# Patient Record
Sex: FEMALE | Race: WHITE | HISPANIC OR LATINO | Employment: OTHER | URBAN - METROPOLITAN AREA
[De-identification: names, ages, dates, MRNs, and addresses within clinical notes are randomized per-mention and may not be internally consistent; named-entity substitution may affect disease eponyms.]

---

## 2021-09-09 ENCOUNTER — INITIAL PRENATAL (OUTPATIENT)
Dept: OBGYN CLINIC | Facility: CLINIC | Age: 21
End: 2021-09-09

## 2021-09-09 VITALS — DIASTOLIC BLOOD PRESSURE: 62 MMHG | SYSTOLIC BLOOD PRESSURE: 100 MMHG | WEIGHT: 118 LBS

## 2021-09-09 DIAGNOSIS — Z34.91 FIRST TRIMESTER PREGNANCY: Primary | ICD-10-CM

## 2021-09-09 DIAGNOSIS — Z11.3 SCREENING EXAMINATION FOR VENEREAL DISEASE: ICD-10-CM

## 2021-09-09 PROCEDURE — PNV: Performed by: PHYSICIAN ASSISTANT

## 2021-09-09 RX ORDER — ESCITALOPRAM OXALATE 10 MG/1
10 TABLET ORAL DAILY
COMMUNITY
End: 2021-09-09
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
## 2021-09-09 NOTE — PATIENT INSTRUCTIONS
Vaccine Decision Making  Im pregnant  Should I get a COVID vaccine? The information here is about the [facility] and Moderna COVID-19 vaccines  These are also called mRNA vaccines  Reference numbers written like this (#) correspond to the footnotes at the end of this section  For most people, getting the COVID vaccine as soon as possible is the safest choice  However, these vaccines have not been tested in pregnant and breastfeeding women yet  The information below will help you make an informed choice about whether to get an mRNA COVID vaccine while you are pregnant or trying to get pregnant  Your options:   Get a COVID vaccine as soon as it is available    Wait for more information about the vaccines in pregnancy    What are the benefits of getting an mRNA COVID Vaccine? 1  COVID is dangerous  It is more dangerous for pregnant women   COVID patients who are pregnant are 5 times more likely to end up in the intensive care unit (ICU) or on a ventilator than COVID patients who are not pregnant  (#1)    birth may be more common for pregnant women with severe COVID  (#2)   Pregnant women are more likely to die of COVID than non-pregnant women with COVID who are the same age  (#3,4)    2  The mRNA COVID vaccines prevent about 95% of COVID infections   As COVID infections go up in our communities, your risk of getting COVID goes up too   Getting a vaccine will prevent you from getting COVID and may help keep you from giving COVID to people around you, like your family  3  The mRNA COVID vaccines cannot give you COVID   These vaccines have no live virus  (#5)   These vaccines do NOT contain ingredients that are known to be harmful to pregnant women or to the fetus   Many vaccines are routinely given in pregnancy and are safe (for example: tetanus, diphtheria, and flu)    More details about how these vaccines work can be found below in the section "More information about mRNA COVID vaccines"  What are the risks of getting an mRNA COVID vaccine? 1  These COVID vaccines have not yet been tested in pregnant people   These vaccines were tested in over 40,000 people, and there were no serious side effects related to the vaccine   We do not know if the vaccines work as well in pregnant people as they did in non-pregnant people   We do not know whether there are unique downsides in pregnancy, like different side effects or an increased risk of miscarriage or fetal abnormalities   The Moderna vaccine was tested in female rats to look at its effects on pregnancy  No significant negative effects were found on female fertility or fetal development   Some women became pregnant during the vaccine studies  Eighteen of these women were in the vaccine group, and two months later none had miscarried  There were [de-identified] women in the placebo group who became pregnant, and two months later two of them had had miscarriages  (In general, 10-20% of pregnancies end in miscarriage)   Because these studies are still ongoing, we dont know how the rest of the pregnancy went for these women  2  People getting the vaccine will probably have some side effects   Many people had symptoms caused by their immune systems normal response to the vaccine  The most common side effects were:(#6)   injection site reactions like sore arm (~84%)   fatigue (~62%)   headache (~55%)   muscle pain (~38%)   chills (~32%)   joint pain (~24%)   fever (~14%)   Of 100 people who get the vaccine, 1 will get a high fever (over 102°F)  A persistent high fever during the first trimester might increase the risk of fetal abnormalities or miscarriage  The CDC recommends using Tylenol (acetaminophen) during pregnancy if you have a high fever  Another option is to delay your COVID vaccine until after the first trimester  What do the experts recommend?   Because COVID is dangerous and easily spread, the CDC says that the mRNA vaccines for COVID-19 are recommended for adults  (#7)  However, because there are no studies of pregnant women yet, there are no clear recommendations for pregnant women  This is standard for a new drug and is not due to any particular concern with this vaccine  The Society for Maternal-Fetal Medicine strongly recommends that pregnant individuals have access to COVID vaccines  They recommend that each person talk to their doctor or midwife about their own personal choice  (#8)    The [address] and Gynecologists recommends that the COVID vaccine should not be withheld from pregnant individuals  (#9)    What else should I think about to help me decide? Make sure you understand as much as you can about COVID and about the vaccine  Ask a trusted source, like your midwife or doctor  Continue reading below for more information about the vaccine  Think about your own personal risk  Look at the columns below and think about your risk of getting COVID (left)  Think about your safety - are you able to stay safe (right)? The risks of getting sick from [facility]  are higher if If you are not at higher risk for COVID  and   ?? You have contact with people  outside your home ? ? You are always able to wear a mask   ? ? You are 28years old or older ? ? You and the people you live with  can socially distance from others for  your whole pregnancy   ? ? You are overweight ? ? Your community does NOT have  high or increasing COVID cases   ? ? You have other medical problems  like diabetes, high blood pressure,  or heart disease ? ? You think the vaccine itself will make  you very nervous (you are more  worried about the unknown risks  than about getting COVID)   ? ? You are a smoker ? ? You have had a severe allergic  reaction to a vaccine   ? ? You are a racial or ethnic minority, or your community has a high rate of COVID infections    ? ?  You are a healthcare worker(#10)    If you are at a higher risk of getting  COVID, it probably makes sense to get  the vaccine   it might make sense for you to wait  for more information  What about breastfeeding? The Society for [address] [place] [address] Street of Breastfeeding Medicine report that there is no reason to believe that the vaccine affects the safety of breastmilk  8,11 The vaccine does not contain the virus, so there is no risk of infecting your baby  Because mRNA is fragile, it is very unlikely that any part of the vaccine gets into breastmilk  When we have an infection or get a vaccine, our bodies make antibodies to fight the infection  Antibodies can pass into the breastmilk and then to the baby - and may help prevent infections  Summary  1  COVID seems to cause more harm in pregnant women than in women of the same age who are not pregnant  2  The risks of getting an mRNA COVID vaccine during pregnancy are thought to be small but are not totally known  3  You should consider your own personal risk of getting COVID  If your personal risk is high, or there are many cases of COVID in your community, it probably makes sense for you to get a vaccine while pregnant  4  Whether to get a COVID vaccine during pregnancy is your choice  What do pregnant doctors think? "We know COVID can be terrible in pregnancy, and we know the vaccine doesn't contain live virus  Im approaching my third trimester and I work on the front lines of this disease, so for me the choice is clear, I intend to be first in line as soon as they will let me have one " (Pregnant Emergency Department Doctor)    "I am a little nervous about getting something that hasnt been tested in pregnant patients  Early pregnancy is a nerve-wracking time, even without the unknown of a new vaccine  So, I went over the risks and benefits of getting or not getting it as a front- - with myself, my partner, and my doctors   We ended up deciding I should get the vaccine " (Pregnant viruses like a cold virus work - viruses use our body and cells to make their proteins  Then our immune system attacks those proteins to keep us healthy   There is no way for the vaccine to give people COVID  (#5)    What did the research show? The Pfizer and Moderna mRNA vaccine trials each had over 30,000 people (including those who got placebo) and showed that the vaccine lowers a persons chance of getting COVID and severe COVID  In each study, over 15,000 people got the vaccine and over 15,000 people got a saline injection (placebo)   After one dose, the vaccine appears to be 50% effective  After 2 doses, both vaccines are about 95% effective   In other words, for every 100 people who got COVID in the placebo group, only 5 people got COVID in the mRNA vaccine groups   Severe cases of COVID were also reduced in both mRNA vaccine groups   There were no serious safety concerns  Intended Use   This decision aid is intended for use by pregnant women (and women planning on becoming pregnant) who are considering getting the COVID-19 vaccine, as well as their  healthcare providers, and their friends and family  It was created by the Shared Decision-Making: COVID Vaccination in Pregnancy working group at the [facility]  This group consists of experts in the fields of OB/GYN, Maternal-Fetal Medicine, Shared Decision-Making and risk communication, Emergency Medicine, and current COVID-19 research  Questions should be directed to Dr [email or web address]  Feedback regarding the utility of this decision aid can be directed through the survey (see link above)  This decision aid can be reproduced and distributed without additional permission  [place] and [place] versions available at http://foamcast org/COVIDvacPregnancy  Updated December 22, 2020  1   Jareth MENDEZ, et al  Pregnant women with severe or critical COVID-19 have increased composite morbidity compared to  non-pregnant matched controls  Am J Obstet 2020 doi: 10 1016/j ajog  11 022  2  Ambika MEJIA, et al  Pregnancy outcomes among women with and without severe acute respiratory syndrome coronavirus  2 infection  [facility]  Nov 3(11):[id number]  3  Gabriela MATTHEW WAP[facility] working group on COVID-19  Maternal and  Outcomes of Pregnancy Women with SARScoV-  2 infection  Ultrasound Obstet Gynecol  2020  doi: 10 1002/uog  73671  4  Centers for Disease Control and Prevention  Update: Characteristics of Symptomatic Women of Reproductive Age with  Laboratory-Confirmed SARS-CoV-2 Infection by Pregnancy Status -- [place], -October 3, 2020  2020:1-7   5  Michael MANDUJANO  COVID-19 and mRNA Vaccines--First Large Test for a New Approach  ALAN  2020;324(12):0549-8499  doi:10 1001/alan   58601  6  [employer] pt  7  [place] com br ([address], )  8  SM statement on COVID vaccination in pregnancy: https://www  fm org/publications/339-society-for-maternal-fetalmedicine-  smfm-statement-sars-cov-2-vaccination-in-pregnancy  9  [employer] com au-  Lactating-Patients-Against-COVID-19 (Accessed 2020)  10  Eliezer Tijerina et al  Occupation and risk of severe COVID-19: prospective cohort study of  [facility] participants  Occupational and Environmental Medicine Published Online First: 2020  doi: 10 1136/cengp-1092-859151  11  https://abm memberclicks net/nfq-rbeaffgau-sjpqccxwhxrmyj-for-covid-19-vaccination-in-lactation    Coronavirus (COVID-19) pregnancy FAQs: Medical experts answer your questions  From [employer] com cy  com/0_coronavirus-covid-19-pregnancy-faq-medical-dpogtyo-cdqmul-rf_48483234  bc (courtesy of [facility])  As of 3/18/20  By Tami Bansal 39  Medically reviewed by Society for Maternal-Fetal Medicine       We asked parents-to-be to send us their most pressing questions about coronavirus (COVID-19)  Among them: Is it still safe to deliver in a hospital? What if my ob-gyn has the virus? We sent those questions to the top docs at the Society for Maternal-Fetal Medicine  Here are their answers  The coronavirus (COVID-19) pandemic has been declared a national emergency in the [facility] by [employer]  Moms-to-be like you are concerned about everything from getting medicines to managing disruptions at work  But above and beyond any worry about lifestyle changes is a focus on your health and the impact of COVID-19 on your pregnancy  We asked obstetrics doctors who handle the most complicated pregnancy issues to answer your questions about the coronavirus  Here are their responses, provided by Dr Mani Sethi and her colleagues at the Society for [place] [address]  Am I at more risk for COVID-19 if I'm pregnant? We don't know  Pregnancy does change your immune system in ways that might make you more susceptible to viral respiratory infections like COVID-19  If you become infected, you might also be at higher risk for more severe illness compared to the general population  Although this does not appear to be the case with COVID-19, based on limited data so far, a higher risk has been true for pregnant women with other coronavirus infections (SARS-CoV and MERS-CoV) and other viral respiratory infections, such as flu  It's important to take preventive actions to avoid infection, such as washing your hands often and avoiding people who are sick  How might coronavirus affect my pregnancy? Again, we don't know  Women with other coronavirus infections (such as SARS-CoV) did not have miscarriage or stillbirth at higher rates than the general population    We know that having other respiratory viral infections during pregnancy, such as flu, has been associated with problems like low birth weight and  birth  Also, having a high fever early in pregnancy may increase the risk of certain birth defects  Could I transmit coronavirus to my baby during pregnancy or delivery? We don't know whether you could transmit COVID-19 to your baby before or during delivery  However, among the few case studies of infants born to mothers with COVID-23 published in peer-reviewed literature, none of the infants tested positive for the virus  Also, there was no virus detected in samples of amniotic fluid or breast milk  There have been a few reports of newborns as young as a few days old with infection, suggesting that a mother can transmit the infection to her infant through close contact after delivery  There have been no reports of mother-to-baby transmission for other coronaviruses (MERS-CoV and SARS-CoV), although only limited information is available  Is it safe for me to deliver at a hospital where there have been COVID-19 cases? Yes  We know that COVID-19 is a very scary virus  The good news is that hospitals are taking great precautions to keep patients and healthcare providers safe  When a patient is even suspected to have COVID-19, they are placed in a negative pressure room  (Think of these rooms as vacuums that suck and filter the air so it's safe for the other people in the hospital)  This makes it possible for you to deliver at the hospital without putting you or your baby at risk  Hospitals are also implementing stricter visiting policies to keep patients safe  It's worth calling your hospital to check if there are any new regulations to be aware of  What plans should I make now in case the hospital system is overwhelmed when it's time for me to deliver? This is a great question to talk with your doctor or midwife about when you're 34 to 36 weeks pregnant   Every hospital is making different plans for dealing with this scenario  I work in healthcare  Should I ask my doctor to excuse me from work until the baby is born? What if I work in a school, the travel [place] [employer], or some other high-risk setting? Healthcare facilities should take care to limit the exposure of pregnant employees to patients with confirmed or suspected COVID-19, just as they would with other infectious cases  If you continue working, be sure to follow the CDC's risk assessment and infection control guidelines  If you work in a school, travel [place] [employer], or other high-risk setting, talk with your employer about what it's doing to protect employees and minimize infection risks  Wash your hands often  What if my OB gets COVID-19? If your doctor or midwife tests positive for COVID-19, they will need to be quarantined until they recover and are no longer at risk of transmitting the virus  In this case, you'll be assigned to another OB in your doctor's practice (or you may choose another practitioner yourself)  Ask your new OB or your doctor's office if you should self-quarantine or be tested for the virus  (It will depend on when you last saw your provider and when that person tested positive )    Should we hold off on trying to conceive because of COVID-19? At this time, there's no reason to hold off on trying to get pregnant, but the data we have is really limited  For example, we don't think the virus causes birth defects or increases your risk of miscarriage  But we don't know whether you could transmit COVID-19 to your baby before or during delivery  We also don't know if the virus lives in semen or can be sexually transmitted  We have a babymoon scheduled in the next few months - should we cancel? If you're planning to travel internationally or on a cruise, you should strongly consider canceling   At this time, the virus has reached more than 140 countries, and there are travel bans to [place], most of [place], and [place]  Places where large numbers of people gather are at highest risk, especially airports and cruise ships  If you're planning travel in the [place] , note that any travel setting increases your risk of exposure, and there may be travel bans even in [place] by the time you're scheduled to go  Even if you're state is not blocked, you'll definitely want to avoid traveling to communities where the virus is spreading  To find out where these places are, check The [place] Times map based on CDC data  For the most current advice to help you avoid exposure, check the CDC's COVID-19 travel page  Will the hospital separate me from my  and keep the baby in quarantine? If you test positive for COVID-19 or have been exposed but have no symptoms, the CDC, [employer] of Obstetricians and Gynecologists, and the Society for [place] 250 all recommend that you be  from your baby to decrease the risk of transmission to the baby  This would last until you are no longer at risk of transmitting the virus  If you do not have COVID-19 and have not been exposed to the virus, the hospital will not separate you from your   My hospital is restricting visitors and only allowing one support person  If my support person leaves after the delivery, will they be allowed to come back? Every hospital has different policies  Contact your hospital or labor and delivery unit a week or so before delivery to get the most up-to-date restrictions  In general, if your support person needs to leave, they would be allowed back unless they knew they were exposed to COVID-19 after leaving your company  [facility] understands that the coronavirus (COVID-19) pandemic is an evolving story and that your questions will change over time   We'll continue asking moms and dads in our Community what they want to know, and we'll get the answers from experts to keep them - and you - informed and supported  My mom was planning to fly here to help me care for my new baby after delivery  Should I tell her not to come? Yes  If your mom is over 61 or has any serious chronic medical conditions (such as heart disease, lung disease, or diabetes), she is at higher risk of serious illness from COVID-19 and should avoid air travel  And remember that any travel setting increases a person's risk of exposure  So, it may be risky to have her around the baby after she has been traveling  For the most current advice on traveling, check the [facility]'s COVID-19 travel page  [facility] understands that the coronavirus pandemic is an evolving story and that your questions will change over time  We'll continue asking moms and dads in our Community what they want to know, and we'll get the answers from experts to keep them - and you - informed and supported

## 2021-09-13 LAB
C TRACH RRNA SPEC QL NAA+PROBE: NEGATIVE
N GONORRHOEA RRNA SPEC QL NAA+PROBE: NEGATIVE
T VAGINALIS RRNA SPEC QL NAA+PROBE: NEGATIVE

## 2021-09-17 ENCOUNTER — PATIENT MESSAGE (OUTPATIENT)
Dept: OBGYN CLINIC | Facility: CLINIC | Age: 21
End: 2021-09-17

## 2021-09-20 NOTE — TELEPHONE ENCOUNTER
Spoke with patient informed that her blurred vision and headache states she has had all weekend denies any pelvic pain , vaginal bleeding or loss of fluids , she is 10 weeks gestation , advised patient to call her PCP and be evaluated , also advised in an emergent situation it is always best to call the office and try to get Intouch with the on call doc after hours rather than send a mychart message to the box that is not managed over the weekend    AMAURY ARTEAGA

## 2021-09-28 LAB
ABO GROUP BLD: NORMAL
APPEARANCE UR: CLEAR
BACTERIA UR CULT: NORMAL
BACTERIA URNS QL MICRO: ABNORMAL
BASOPHILS # BLD AUTO: 0 X10E3/UL (ref 0–0.2)
BASOPHILS NFR BLD AUTO: 0 %
BILIRUB UR QL STRIP: NEGATIVE
BLD GP AB SCN SERPL QL: NEGATIVE
CASTS URNS QL MICRO: ABNORMAL /LPF
CF COMMENT: NORMAL
CFTR MUT ANL BLD/T: NORMAL
CLINICAL INFO: NORMAL
COLOR UR: YELLOW
DEPRECATED FTI SERPL-MCNC: 2.3 UG/DL (ref 1.2–4.9)
EOSINOPHIL # BLD AUTO: 0 X10E3/UL (ref 0–0.4)
EOSINOPHIL NFR BLD AUTO: 0 %
EPI CELLS #/AREA URNS HPF: ABNORMAL /HPF (ref 0–10)
ERYTHROCYTE [DISTWIDTH] IN BLOOD BY AUTOMATED COUNT: 11.8 % (ref 11.7–15.4)
ETHNIC BACKGROUND STATED: NORMAL
EXTERNAL RUBELLA IGG QUANTITATION: NORMAL
GENE MUT TESTED BLD/T: NORMAL
GENERAL COMMENTS:: NORMAL
GLUCOSE UR QL: NEGATIVE
HBV SURFACE AG SERPL QL IA: NEGATIVE
HCT VFR BLD AUTO: 32.9 % (ref 34–46.6)
HCV AB S/CO SERPL IA: 0.1 S/CO RATIO (ref 0–0.9)
HGB A MFR BLD: 2.6 % (ref 1.8–3.2)
HGB A MFR BLD: 96.8 % (ref 96.4–98.8)
HGB BLD-MCNC: 11.3 G/DL (ref 11.1–15.9)
HGB F MFR BLD: 0.6 % (ref 0–2)
HGB FRACT BLD-IMP: NORMAL
HGB S MFR BLD: 0 %
HGB UR QL STRIP: NEGATIVE
HIV 1+2 AB+HIV1 P24 AG SERPL QL IA: NON REACTIVE
IMM GRANULOCYTES # BLD: 0 X10E3/UL (ref 0–0.1)
IMM GRANULOCYTES NFR BLD: 0 %
KETONES UR QL STRIP: NEGATIVE
LAB DIRECTOR NAME PROVIDER: NORMAL
LEUKOCYTE ESTERASE UR QL STRIP: ABNORMAL
LYMPHOCYTES # BLD AUTO: 1.6 X10E3/UL (ref 0.7–3.1)
LYMPHOCYTES NFR BLD AUTO: 22 %
Lab: NO GROWTH
MCH RBC QN AUTO: 33.8 PG (ref 26.6–33)
MCHC RBC AUTO-ENTMCNC: 34.3 G/DL (ref 31.5–35.7)
MCV RBC AUTO: 99 FL (ref 79–97)
MICRO URNS: ABNORMAL
MONOCYTES # BLD AUTO: 0.4 X10E3/UL (ref 0.1–0.9)
MONOCYTES NFR BLD AUTO: 6 %
MUCOUS THREADS URNS QL MICRO: PRESENT
NEUTROPHILS # BLD AUTO: 5.3 X10E3/UL (ref 1.4–7)
NEUTROPHILS NFR BLD AUTO: 72 %
NITRITE UR QL STRIP: NEGATIVE
PH UR STRIP: 6 [PH] (ref 5–7.5)
PLATELET # BLD AUTO: 222 X10E3/UL (ref 150–450)
PROT UR QL STRIP: NEGATIVE
RBC # BLD AUTO: 3.34 X10E6/UL (ref 3.77–5.28)
RBC #/AREA URNS HPF: ABNORMAL /HPF (ref 0–2)
REASON FOR REFERRAL (NARRATIVE): NORMAL
REF LAB TEST METHOD: NORMAL
RH BLD: POSITIVE
RPR SER QL: NON REACTIVE
RUBV IGG SERPL IA-ACNC: 1.59 INDEX
SL AMB DISCLAIMER: NORMAL
SL AMB GENETIC COUNSELOR: NORMAL
SMN1 GENE MUT ANL BLD/T: NORMAL
SP GR UR: 1.01 (ref 1–1.03)
SPECIMEN SOURCE: NORMAL
T3RU NFR SERPL: 22 % (ref 24–39)
T4 SERPL-MCNC: 10.6 UG/DL (ref 4.5–12)
UROBILINOGEN UR STRIP-ACNC: 0.2 MG/DL (ref 0.2–1)
VZV IGG SER IA-ACNC: 1166 INDEX
WBC # BLD AUTO: 7.4 X10E3/UL (ref 3.4–10.8)
WBC #/AREA URNS HPF: ABNORMAL /HPF (ref 0–5)

## 2021-10-01 ENCOUNTER — TELEPHONE (OUTPATIENT)
Dept: PERINATAL CARE | Facility: CLINIC | Age: 21
End: 2021-10-01

## 2021-10-07 ENCOUNTER — ROUTINE PRENATAL (OUTPATIENT)
Dept: PERINATAL CARE | Facility: OTHER | Age: 21
End: 2021-10-07
Payer: COMMERCIAL

## 2021-10-07 VITALS
HEIGHT: 64 IN | HEART RATE: 101 BPM | WEIGHT: 120.37 LBS | BODY MASS INDEX: 20.55 KG/M2 | DIASTOLIC BLOOD PRESSURE: 74 MMHG | SYSTOLIC BLOOD PRESSURE: 115 MMHG

## 2021-10-07 DIAGNOSIS — Z81.3 FAMILY HISTORY OF DRUG ADDICTION: ICD-10-CM

## 2021-10-07 DIAGNOSIS — Z3A.13 13 WEEKS GESTATION OF PREGNANCY: ICD-10-CM

## 2021-10-07 DIAGNOSIS — Z36.82 ENCOUNTER FOR ANTENATAL SCREENING FOR NUCHAL TRANSLUCENCY: Primary | ICD-10-CM

## 2021-10-07 PROCEDURE — 99212 OFFICE O/P EST SF 10 MIN: CPT | Performed by: OBSTETRICS & GYNECOLOGY

## 2021-10-07 PROCEDURE — 76813 OB US NUCHAL MEAS 1 GEST: CPT | Performed by: OBSTETRICS & GYNECOLOGY

## 2021-10-07 RX ORDER — NALOXONE HYDROCHLORIDE 4 MG/.1ML
1 SPRAY NASAL ONCE AS NEEDED
Qty: 1 EACH | Refills: 5 | Status: SHIPPED | OUTPATIENT
Start: 2021-10-07

## 2021-10-11 ENCOUNTER — ROUTINE PRENATAL (OUTPATIENT)
Dept: OBGYN CLINIC | Facility: CLINIC | Age: 21
End: 2021-10-11

## 2021-10-11 VITALS — BODY MASS INDEX: 20.6 KG/M2 | DIASTOLIC BLOOD PRESSURE: 60 MMHG | WEIGHT: 120 LBS | SYSTOLIC BLOOD PRESSURE: 102 MMHG

## 2021-10-11 DIAGNOSIS — Z34.02 ENCOUNTER FOR SUPERVISION OF NORMAL FIRST PREGNANCY IN SECOND TRIMESTER: Primary | ICD-10-CM

## 2021-10-11 PROCEDURE — PNV: Performed by: NURSE PRACTITIONER

## 2021-10-13 LAB
CFDNA.FET/CFDNA.TOTAL SFR FETUS: NORMAL %
CFDNA.FET/CFDNA.TOTAL SFR FETUS: NORMAL %
CITATION REF LAB TEST: NORMAL
FET 13+18+21+X+Y ANEUP PLAS.CFDNA: NEGATIVE
FET CHR 21 TS PLAS.CFDNA QL: NEGATIVE
FET CHR 21 TS PLAS.CFDNA QL: NEGATIVE
FET MS X RISK WBC.DNA+CFDNA QL: NOT DETECTED
FET SEX PLAS.CFDNA DOSAGE CFDNA: NORMAL
FET TS 13 RISK PLAS.CFDNA QL: NEGATIVE
FET X + Y ANEUP RISK PLAS.CFDNA SEQ-IMP: NOT DETECTED
GESTATION: NORMAL
GESTATIONAL AGE > 9:: YES
LAB DIRECTOR NAME PROVIDER: NORMAL
LABORATORY COMMENT REPORT: NORMAL
LIMITATIONS OF THE TEST: NORMAL
NEGATIVE PREDICTIVE VALUE: NORMAL
PERFORMANCE CHARACTERISTICS: NORMAL
POSITIVE PREDICTIVE VALUE: NORMAL
REF LAB TEST METHOD: NORMAL
SL AMB NOTE:: NORMAL
TEST PERFORMANCE INFO SPEC: NORMAL

## 2021-10-15 ENCOUNTER — TELEPHONE (OUTPATIENT)
Dept: PERINATAL CARE | Facility: OTHER | Age: 21
End: 2021-10-15

## 2021-11-04 ENCOUNTER — TELEPHONE (OUTPATIENT)
Dept: FAMILY MEDICINE CLINIC | Facility: CLINIC | Age: 21
End: 2021-11-04

## 2021-11-04 LAB
2ND TRIMESTER 4 SCREEN SERPL-IMP: NORMAL
AFP ADJ MOM SERPL: 0.79
AFP INTERP AMN-IMP: NORMAL
AFP INTERP SERPL-IMP: NORMAL
AFP INTERP SERPL-IMP: NORMAL
AFP SERPL-MCNC: 33.4 NG/ML
AGE AT DELIVERY: 21.2 YR
GA METHOD: NORMAL
GA: 16.6 WEEKS
IDDM PATIENT QL: NO
MULTIPLE PREGNANCY: NO
NEURAL TUBE DEFECT RISK FETUS: NORMAL %

## 2021-11-08 ENCOUNTER — ROUTINE PRENATAL (OUTPATIENT)
Dept: OBGYN CLINIC | Facility: CLINIC | Age: 21
End: 2021-11-08

## 2021-11-08 VITALS — WEIGHT: 126 LBS | DIASTOLIC BLOOD PRESSURE: 58 MMHG | SYSTOLIC BLOOD PRESSURE: 102 MMHG | BODY MASS INDEX: 21.63 KG/M2

## 2021-11-08 DIAGNOSIS — Z34.02 ENCOUNTER FOR SUPERVISION OF NORMAL FIRST PREGNANCY IN SECOND TRIMESTER: Primary | ICD-10-CM

## 2021-11-08 PROCEDURE — PNV: Performed by: NURSE PRACTITIONER

## 2021-11-30 ENCOUNTER — ROUTINE PRENATAL (OUTPATIENT)
Dept: PERINATAL CARE | Facility: OTHER | Age: 21
End: 2021-11-30
Payer: COMMERCIAL

## 2021-11-30 VITALS
HEIGHT: 64 IN | SYSTOLIC BLOOD PRESSURE: 117 MMHG | DIASTOLIC BLOOD PRESSURE: 79 MMHG | WEIGHT: 129.41 LBS | BODY MASS INDEX: 22.09 KG/M2 | HEART RATE: 89 BPM

## 2021-11-30 DIAGNOSIS — O28.3 ECHOGENIC INTRACARDIAC FOCUS OF FETUS ON PRENATAL ULTRASOUND: ICD-10-CM

## 2021-11-30 DIAGNOSIS — Z36.86 ENCOUNTER FOR ANTENATAL SCREENING FOR CERVICAL LENGTH: ICD-10-CM

## 2021-11-30 DIAGNOSIS — Z36.89 ENCOUNTER FOR FETAL ANATOMIC SURVEY: ICD-10-CM

## 2021-11-30 DIAGNOSIS — Z3A.20 20 WEEKS GESTATION OF PREGNANCY: Primary | ICD-10-CM

## 2021-11-30 PROBLEM — F32.A DEPRESSION AFFECTING PREGNANCY: Status: ACTIVE | Noted: 2021-11-30

## 2021-11-30 PROBLEM — O99.340 DEPRESSION AFFECTING PREGNANCY: Status: ACTIVE | Noted: 2021-11-30

## 2021-11-30 PROBLEM — O99.519 ASTHMA DURING PREGNANCY: Status: ACTIVE | Noted: 2021-11-30

## 2021-11-30 PROBLEM — J45.909 ASTHMA DURING PREGNANCY: Status: ACTIVE | Noted: 2021-11-30

## 2021-11-30 PROCEDURE — 99213 OFFICE O/P EST LOW 20 MIN: CPT | Performed by: OBSTETRICS & GYNECOLOGY

## 2021-11-30 PROCEDURE — 76811 OB US DETAILED SNGL FETUS: CPT | Performed by: OBSTETRICS & GYNECOLOGY

## 2021-11-30 PROCEDURE — 76817 TRANSVAGINAL US OBSTETRIC: CPT | Performed by: OBSTETRICS & GYNECOLOGY

## 2021-12-06 ENCOUNTER — ROUTINE PRENATAL (OUTPATIENT)
Dept: OBGYN CLINIC | Facility: CLINIC | Age: 21
End: 2021-12-06

## 2021-12-06 VITALS
DIASTOLIC BLOOD PRESSURE: 70 MMHG | TEMPERATURE: 97.6 F | BODY MASS INDEX: 22.97 KG/M2 | WEIGHT: 133.8 LBS | SYSTOLIC BLOOD PRESSURE: 100 MMHG

## 2021-12-06 DIAGNOSIS — Z3A.21 PREGNANCY WITH 21 COMPLETED WEEKS GESTATION: Primary | ICD-10-CM

## 2021-12-06 PROCEDURE — PNV: Performed by: OBSTETRICS & GYNECOLOGY

## 2022-01-20 ENCOUNTER — ROUTINE PRENATAL (OUTPATIENT)
Dept: OBGYN CLINIC | Facility: CLINIC | Age: 22
End: 2022-01-20
Payer: COMMERCIAL

## 2022-01-20 VITALS
DIASTOLIC BLOOD PRESSURE: 60 MMHG | WEIGHT: 144 LBS | BODY MASS INDEX: 24.72 KG/M2 | SYSTOLIC BLOOD PRESSURE: 100 MMHG | TEMPERATURE: 98 F

## 2022-01-20 DIAGNOSIS — Z3A.28 28 WEEKS GESTATION OF PREGNANCY: Primary | ICD-10-CM

## 2022-01-20 DIAGNOSIS — Z23 NEED FOR TDAP VACCINATION: ICD-10-CM

## 2022-01-20 DIAGNOSIS — Z23 NEED FOR INFLUENZA VACCINATION: ICD-10-CM

## 2022-01-20 PROCEDURE — 90471 IMMUNIZATION ADMIN: CPT | Performed by: STUDENT IN AN ORGANIZED HEALTH CARE EDUCATION/TRAINING PROGRAM

## 2022-01-20 PROCEDURE — 90472 IMMUNIZATION ADMIN EACH ADD: CPT | Performed by: STUDENT IN AN ORGANIZED HEALTH CARE EDUCATION/TRAINING PROGRAM

## 2022-01-20 PROCEDURE — 90715 TDAP VACCINE 7 YRS/> IM: CPT | Performed by: STUDENT IN AN ORGANIZED HEALTH CARE EDUCATION/TRAINING PROGRAM

## 2022-01-20 PROCEDURE — PNV: Performed by: STUDENT IN AN ORGANIZED HEALTH CARE EDUCATION/TRAINING PROGRAM

## 2022-01-20 PROCEDURE — 90686 IIV4 VACC NO PRSV 0.5 ML IM: CPT | Performed by: STUDENT IN AN ORGANIZED HEALTH CARE EDUCATION/TRAINING PROGRAM

## 2022-01-20 NOTE — LETTER
To Whom it May Concern:     Is under my professional care  She is currently 28 weeks and 0 days pregnant   Her estimated due date is 04/14/2022   If you have any questions or concerns, please don't hesitate to call           Sincerely,

## 2022-01-20 NOTE — PATIENT INSTRUCTIONS
Pregnancy at 32 to 30 Weeks   AMBULATORY CARE:   What changes are happening to your body: You may notice new symptoms such as shortness of breath, heartburn, or swelling of your ankles and feet  You may also have trouble sleeping or contractions  Seek care immediately if:   · You develop a severe headache that does not go away  · You have new or increased vision changes, such as blurred or spotted vision  · You have new or increased swelling in your face or hands  · You have vaginal spotting or bleeding  · Your water broke or you feel warm water gushing or trickling from your vagina  Call your doctor or obstetrician if:   · You have more than 5 contractions in 1 hour  · You notice any changes in your baby's movements  · You have abdominal cramps, pressure, or tightening  · You have a change in vaginal discharge  · You have chills or a fever  · You have vaginal itching, burning, or pain  · You have yellow, green, white, or foul-smelling vaginal discharge  · You have pain or burning when you urinate, less urine than usual, or pink or bloody urine  · You have questions or concerns about your condition or care  How to care for yourself at this stage of your pregnancy:       · Eat a variety of healthy foods  Healthy foods include fruits, vegetables, whole-grain breads, low-fat dairy foods, beans, lean meats, and fish  Drink liquids as directed  Ask how much liquid to drink each day and which liquids are best for you  Limit caffeine to less than 200 milligrams each day  Limit your intake of fish to 2 servings each week  Choose fish low in mercury such as canned light tuna, shrimp, salmon, cod, or tilapia  Do not  eat fish high in mercury such as swordfish, tilefish, ann marie mackerel, and shark  · Manage heartburn  by eating 4 or 5 small meals each day instead of large meals  Avoid spicy food  · Manage swelling  by lying down and putting your feet up           · Take prenatal vitamins as directed  Your need for certain vitamins and minerals, such as folic acid, increases during pregnancy  Prenatal vitamins provide some of the extra vitamins and minerals you need  Prenatal vitamins may also help to decrease the risk of certain birth defects  · Talk to your healthcare provider about exercise  Moderate exercise can help you stay fit  Your healthcare provider will help you plan an exercise program that is safe for you during pregnancy  · Do not smoke  Smoking increases your risk of a miscarriage and other health problems during your pregnancy  Smoking can cause your baby to be born too early or weigh less at birth  Ask your healthcare provider for information if you need help quitting  · Do not drink alcohol  Alcohol passes from your body to your baby through the placenta  It can affect your baby's brain development and cause fetal alcohol syndrome (FAS)  FAS is a group of conditions that causes mental, behavior, and growth problems  · Talk to your healthcare provider before you take any medicines  Many medicines may harm your baby if you take them when you are pregnant  Do not take any medicines, vitamins, herbs, or supplements without first talking to your healthcare provider  Never use illegal or street drugs (such as marijuana or cocaine) while you are pregnant  Safety tips during pregnancy:   · Avoid hot tubs and saunas  Do not use a hot tub or sauna while you are pregnant, especially during your first trimester  Hot tubs and saunas may raise your baby's temperature and increase the risk of birth defects  · Avoid toxoplasmosis  This is an infection caused by eating raw meat or being around infected cat feces  It can cause birth defects, miscarriages, and other problems  Wash your hands after you touch raw meat  Make sure any meat is well-cooked before you eat it  Avoid raw eggs and unpasteurized milk   Use gloves or ask someone else to clean your cat's litter box while you are pregnant  Changes that are happening with your baby:  By 30 weeks, your baby may weigh more than 3 pounds  Your baby may be about 11 inches long from the top of the head to the rump (baby's bottom)  Your baby's eyes open and close now  Your baby's kicks and movements are more forceful at this time  What you need to know about prenatal care: Your healthcare provider will check your blood pressure and weight  You may also need the following:  · Blood tests  may be done to check for anemia or blood type  · A urine test  may also be done to check for sugar and protein  These can be signs of gestational diabetes or infection  Protein in your urine may also be a sign of preeclampsia  Preeclampsia is a condition that can develop during week 20 or later of your pregnancy  It causes high blood pressure, and it can cause problems with your kidneys and other organs  · A Tdap vaccine and flu vaccine  may be recommended by your healthcare provider  · A gestational diabetes screen  will be done using an oral glucose tolerance test (OGTT)  An OGTT starts with a blood sugar level check after you have not eaten for 8 hours  You are then given a glucose drink  Your blood sugar level is checked after 1 hour, 2 hours, and sometimes 3 hours  Healthcare providers look at how much your blood sugar level increases from the first check  · Fundal height  is a measurement of your uterus to check your baby's growth  This number is usually the same as the number of weeks that you have been pregnant  Your healthcare provider may also check your baby's position  · Your baby's heart rate  will be checked  Follow up with your doctor or obstetrician as directed:  Write down your questions so you remember to ask them during your visits  © Copyright [employer] 2021 Information is for End User's use only and may not be sold, redistributed or otherwise used for commercial purposes   All illustrations and images included in CareNotes® are the copyrighted property of A D A M [employer] Inc  or Sammy Mcbride  The above information is an  only  It is not intended as medical advice for individual conditions or treatments  Talk to your doctor, nurse or pharmacist before following any medical regimen to see if it is safe and effective for you

## 2022-01-20 NOTE — PROGRESS NOTES
OB/GYN prenatal visit    S: 25 y o  Anuja Palencia 28w0d here for PN visit  She has no obstetric complaints, including pelvic pain, contractions, vaginal bleeding, loss of fluid, or decreased fetal movement       O:  Vitals:    01/20/22 1618   BP: 100/60   Temp: 98 °F (36 7 °C)       Gen: no acute distress, nonlabored breathing  Fundal Height (cm): 28 cm  Fetal Heart Rate: 138    A/P:  IUP at 28w0d  No obstetric complaints today  First tri labs wnl  Third tri labs--ordered today  Rh status POS    Flu vaccine--accepts, TDAP vaccine--accepts, covid vaccine--declines    Contraception: NA  Breastfeeding: yes, desires pump, 30 week folder given  Birth plan: NA    Discussed pre-term labor precautions and fetal kick counts    Return to office in 2 weeks    COVID 19 precautions were discussed with patient at length, reviewed symptoms, hygiene, social distancing, patient to call office 24/7 with questions/concerns      Rocio Salcedo MD  1/30/2022  11:49 AM

## 2022-01-26 ENCOUNTER — TELEPHONE (OUTPATIENT)
Dept: OBGYN CLINIC | Facility: CLINIC | Age: 22
End: 2022-01-26

## 2022-01-26 DIAGNOSIS — O99.013 ANEMIA DURING PREGNANCY IN THIRD TRIMESTER: Primary | ICD-10-CM

## 2022-01-26 LAB
ERYTHROCYTE [DISTWIDTH] IN BLOOD BY AUTOMATED COUNT: 11.7 % (ref 11.7–15.4)
EXTERNAL SYPHILIS RPR SCREEN: NORMAL
GLUCOSE 1H P 50 G GLC PO SERPL-MCNC: 110 MG/DL (ref 65–139)
HCT VFR BLD AUTO: 28.8 % (ref 34–46.6)
HGB BLD-MCNC: 9.7 G/DL (ref 11.1–15.9)
MCH RBC QN AUTO: 33 PG (ref 26.6–33)
MCHC RBC AUTO-ENTMCNC: 33.7 G/DL (ref 31.5–35.7)
MCV RBC AUTO: 98 FL (ref 79–97)
PLATELET # BLD AUTO: 226 X10E3/UL (ref 150–450)
RBC # BLD AUTO: 2.94 X10E6/UL (ref 3.77–5.28)
RPR SER QL: NON REACTIVE
WBC # BLD AUTO: 8.6 X10E3/UL (ref 3.4–10.8)

## 2022-01-26 RX ORDER — SODIUM CHLORIDE 9 MG/ML
20 INJECTION, SOLUTION INTRAVENOUS ONCE
Status: CANCELLED | OUTPATIENT
Start: 2022-01-27

## 2022-01-26 NOTE — TELEPHONE ENCOUNTER
Called patient to review labs and offer IV venofer  Patient is in agreement with plan  Will need additional labs for insurance coverage  Will complete those asap at [facility] will have therapy plan entered so that we can start the process of prior authorization and scheduling once iron panel is completed

## 2022-02-04 ENCOUNTER — HOSPITAL ENCOUNTER (OUTPATIENT)
Dept: INFUSION CENTER | Facility: HOSPITAL | Age: 22
Discharge: HOME | End: 2022-02-04
Attending: STUDENT IN AN ORGANIZED HEALTH CARE EDUCATION/TRAINING PROGRAM
Payer: COMMERCIAL

## 2022-02-04 VITALS
RESPIRATION RATE: 20 BRPM | DIASTOLIC BLOOD PRESSURE: 67 MMHG | HEART RATE: 77 BPM | SYSTOLIC BLOOD PRESSURE: 109 MMHG | TEMPERATURE: 97.5 F | OXYGEN SATURATION: 98 %

## 2022-02-04 DIAGNOSIS — O99.013 ANEMIA DURING PREGNANCY IN THIRD TRIMESTER: Primary | ICD-10-CM

## 2022-02-04 PROCEDURE — 96365 THER/PROPH/DIAG IV INF INIT: CPT

## 2022-02-04 RX ORDER — SODIUM CHLORIDE 9 MG/ML
20 INJECTION, SOLUTION INTRAVENOUS ONCE
Status: CANCELLED | OUTPATIENT
Start: 2022-02-04

## 2022-02-04 RX ORDER — SODIUM CHLORIDE 9 MG/ML
20 INJECTION, SOLUTION INTRAVENOUS ONCE
Status: CANCELLED | OUTPATIENT
Start: 2022-02-11

## 2022-02-04 RX ORDER — SODIUM CHLORIDE 9 MG/ML
20 INJECTION, SOLUTION INTRAVENOUS ONCE
Status: COMPLETED | OUTPATIENT
Start: 2022-02-04 | End: 2022-02-04

## 2022-02-04 RX ADMIN — IRON SUCROSE 200 MG: 20 INJECTION, SOLUTION INTRAVENOUS at 11:58

## 2022-02-04 RX ADMIN — SODIUM CHLORIDE 20 ML/HR: 9 INJECTION, SOLUTION INTRAVENOUS at 11:58

## 2022-02-04 NOTE — PLAN OF CARE
Problem: Potential for Falls  Goal: Patient will remain free of falls  Description: INTERVENTIONS:  - Educate patient/family on patient safety including physical limitations  - Instruct patient to call for assistance with activity   - Keep Call bell within reach  Outcome: Progressing

## 2022-02-06 PROBLEM — Z3A.30 30 WEEKS GESTATION OF PREGNANCY: Status: ACTIVE | Noted: 2021-11-30

## 2022-02-06 NOTE — PATIENT INSTRUCTIONS
Pregnancy at 31 to 34 Weeks     AMBULATORY CARE:   Changes happening with your body: You may continue to have symptoms such as shortness of breath, heartburn, contractions, or swelling of your ankles and feet  You may be gaining about 1 pound a week now  Seek care immediately if:   · You develop a severe headache that does not go away  · You have new or increased vision changes, such as blurred or spotted vision  · You have new or increased swelling in your face or hands  · You have vaginal spotting or bleeding  · Your water broke or you feel warm water gushing or trickling from your vagina  Call your obstetrician if:   · You have more than 5 contractions in 1 hour  · You notice any changes in your baby's movements  · You have abdominal cramps, pressure, or tightening  · You have a change in vaginal discharge  · You have chills or a fever  · You have vaginal itching, burning, or pain  · You have yellow, green, white, or foul-smelling vaginal discharge  · You have pain or burning when you urinate, less urine than usual, or pink or bloody urine  · You have questions or concerns about your condition or care  How to care for yourself at this stage of your pregnancy:       · Eat a variety of healthy foods  Healthy foods include fruits, vegetables, whole-grain breads, low-fat dairy foods, beans, lean meats, and fish  Drink liquids as directed  Ask how much liquid to drink each day and which liquids are best for you  Limit caffeine to less than 200 milligrams each day  Limit your intake of fish to 2 servings each week  Choose fish low in mercury such as canned light tuna, shrimp, salmon, cod, or tilapia  Do not  eat fish high in mercury such as swordfish, tilefish, ann marie mackerel, and shark  · Manage heartburn  by eating 4 or 5 small meals each day instead of large meals  Avoid spicy food  · Manage swelling  by lying down and putting your feet up           · Take prenatal vitamins as directed  Your need for certain vitamins and minerals, such as folic acid, increases during pregnancy  Prenatal vitamins provide some of the extra vitamins and minerals you need  Prenatal vitamins may also help to decrease the risk of certain birth defects  · Talk to your healthcare provider about exercise  Moderate exercise can help you stay fit  Your healthcare provider will help you plan an exercise program that is safe for you during pregnancy  · Do not smoke  Smoking increases your risk of a miscarriage and other health problems during your pregnancy  Smoking can cause your baby to be born too early or weigh less at birth  Ask your healthcare provider for information if you need help quitting  · Do not drink alcohol  Alcohol passes from your body to your baby through the placenta  It can affect your baby's brain development and cause fetal alcohol syndrome (FAS)  FAS is a group of conditions that causes mental, behavior, and growth problems  · Talk to your healthcare provider before you take any medicines  Many medicines may harm your baby if you take them when you are pregnant  Do not take any medicines, vitamins, herbs, or supplements without first talking to your healthcare provider  Never use illegal or street drugs (such as marijuana or cocaine) while you are pregnant  Safety tips during pregnancy:   · Avoid hot tubs and saunas  Do not use a hot tub or sauna while you are pregnant, especially during your first trimester  Hot tubs and saunas may raise your baby's temperature and increase the risk of birth defects  · Avoid toxoplasmosis  This is an infection caused by eating raw meat or being around infected cat feces  It can cause birth defects, miscarriages, and other problems  Wash your hands after you touch raw meat  Make sure any meat is well-cooked before you eat it  Avoid raw eggs and unpasteurized milk   Use gloves or ask someone else to clean your cat's litter box while you are pregnant  Changes happening with your baby:  By 34 weeks, your baby may weigh more than 5 pounds  Your baby will be about 12 ½ inches long from the top of the head to the rump (baby's bottom)  Your baby is gaining about ½ pound a week  Your baby's eyes open and close now  Your baby's kicks and movements are more forceful at this time  What you need to know about prenatal care: Your healthcare provider will check your blood pressure and weight  You may also need the following:  · A urine test  may also be done to check for sugar and protein  These can be signs of gestational diabetes or infection  Protein in your urine may also be a sign of preeclampsia  Preeclampsia is a condition that can develop during week 20 or later of your pregnancy  It causes high blood pressure, and it can cause problems with your kidneys and other organs  · A Tdap vaccine  may be recommended by your healthcare provider  · Fundal height  is a measurement of your uterus to check your baby's growth  This number is usually the same as the number of weeks that you have been pregnant  Your healthcare provider may also check your baby's position  · Your baby's heart rate  will be checked  Follow up with your obstetrician as directed:  Write down your questions so you remember to ask them during your visits  © Copyright [employer] 2021 Information is for End User's use only and may not be sold, redistributed or otherwise used for commercial purposes  All illustrations and images included in CareNotes® are the copyrighted property of A D A M , Inc  or Sammy Acosta   The above information is an  only  It is not intended as medical advice for individual conditions or treatments  Talk to your doctor, nurse or pharmacist before following any medical regimen to see if it is safe and effective for you

## 2022-02-06 NOTE — PROGRESS NOTES
OB/GYN prenatal visit    S: 25 y o  Juan Sanchez 30w4d here for PN visit  She reports very infrequent contractions--none today, but otherwise no pelvic pain, vaginal bleeding, loss of fluid, or decreased fetal movement       Started IV Fe, tolerating well    O:  Vitals:    02/07/22 1119   BP: 102/80     Gen: no acute distress, nonlabored breathing  Fundal Height (cm): 30 cm  Fetal Heart Rate: 135    A/P:  IUP at 30w4d   No obstetric complaints today  First tri labs wnl  Third tri labs--notable for anemia--getting IV Fe  Rh status POS  Anatomy notable for echogenic intracardiac focus, no additional ultrasound recommended or scheduled    Flu vaccine--received, TDAP vaccine--recieved, covid vaccine--declines    Contraception: considering pills versus Nexplanon, leaning towards nexplanon  Breastfeeding: yes, desires pump, 30 week folder given previously  Birth plan: NA    Discussed pre-term labor precautions and fetal kick counts    Return to office in 2 weeks    Michelle Vale MD  2/7/2022  11:46 AM

## 2022-02-07 ENCOUNTER — ROUTINE PRENATAL (OUTPATIENT)
Dept: OBGYN CLINIC | Facility: CLINIC | Age: 22
End: 2022-02-07

## 2022-02-07 VITALS — WEIGHT: 147 LBS | DIASTOLIC BLOOD PRESSURE: 80 MMHG | BODY MASS INDEX: 25.23 KG/M2 | SYSTOLIC BLOOD PRESSURE: 102 MMHG

## 2022-02-07 DIAGNOSIS — Z3A.30 30 WEEKS GESTATION OF PREGNANCY: Primary | ICD-10-CM

## 2022-02-07 PROCEDURE — PNV: Performed by: STUDENT IN AN ORGANIZED HEALTH CARE EDUCATION/TRAINING PROGRAM

## 2022-02-11 ENCOUNTER — HOSPITAL ENCOUNTER (OUTPATIENT)
Dept: INFUSION CENTER | Facility: HOSPITAL | Age: 22
Discharge: HOME | End: 2022-02-11
Attending: STUDENT IN AN ORGANIZED HEALTH CARE EDUCATION/TRAINING PROGRAM
Payer: COMMERCIAL

## 2022-02-11 VITALS
SYSTOLIC BLOOD PRESSURE: 104 MMHG | TEMPERATURE: 96.9 F | HEART RATE: 88 BPM | DIASTOLIC BLOOD PRESSURE: 66 MMHG | RESPIRATION RATE: 18 BRPM | OXYGEN SATURATION: 97 %

## 2022-02-11 DIAGNOSIS — O99.013 ANEMIA DURING PREGNANCY IN THIRD TRIMESTER: Primary | ICD-10-CM

## 2022-02-11 PROCEDURE — 96365 THER/PROPH/DIAG IV INF INIT: CPT

## 2022-02-11 RX ORDER — SODIUM CHLORIDE 9 MG/ML
20 INJECTION, SOLUTION INTRAVENOUS ONCE
Status: COMPLETED | OUTPATIENT
Start: 2022-02-11 | End: 2022-02-11

## 2022-02-11 RX ORDER — SODIUM CHLORIDE 9 MG/ML
20 INJECTION, SOLUTION INTRAVENOUS ONCE
Status: CANCELLED | OUTPATIENT
Start: 2022-02-18

## 2022-02-11 RX ADMIN — SODIUM CHLORIDE 20 ML/HR: 9 INJECTION, SOLUTION INTRAVENOUS at 10:36

## 2022-02-11 RX ADMIN — IRON SUCROSE 200 MG: 20 INJECTION, SOLUTION INTRAVENOUS at 10:36

## 2022-02-11 NOTE — PLAN OF CARE
Problem: Potential for Falls  Goal: Patient will remain free of falls  Description: INTERVENTIONS:  - Educate patient/family on patient safety including physical limitations  - Instruct patient to call for assistance with activity   - Consult OT/PT to assist with strengthening/mobility   - Keep Call bell within reach  - Keep bed low and locked with side rails adjusted as appropriate  - Keep care items and personal belongings within reach  - Initiate and maintain comfort rounds  - Make Fall Risk Sign visible to staff  -

## 2022-02-18 ENCOUNTER — HOSPITAL ENCOUNTER (OUTPATIENT)
Dept: INFUSION CENTER | Facility: HOSPITAL | Age: 22
Discharge: HOME | End: 2022-02-18
Attending: STUDENT IN AN ORGANIZED HEALTH CARE EDUCATION/TRAINING PROGRAM
Payer: COMMERCIAL

## 2022-02-18 ENCOUNTER — ROUTINE PRENATAL (OUTPATIENT)
Dept: OBGYN CLINIC | Facility: CLINIC | Age: 22
End: 2022-02-18

## 2022-02-18 VITALS
RESPIRATION RATE: 20 BRPM | HEART RATE: 96 BPM | DIASTOLIC BLOOD PRESSURE: 65 MMHG | SYSTOLIC BLOOD PRESSURE: 104 MMHG | TEMPERATURE: 97.6 F | OXYGEN SATURATION: 9 %

## 2022-02-18 VITALS — WEIGHT: 153 LBS | SYSTOLIC BLOOD PRESSURE: 108 MMHG | DIASTOLIC BLOOD PRESSURE: 68 MMHG | BODY MASS INDEX: 26.26 KG/M2

## 2022-02-18 DIAGNOSIS — O99.013 ANEMIA DURING PREGNANCY IN THIRD TRIMESTER: Primary | ICD-10-CM

## 2022-02-18 DIAGNOSIS — Z34.03 ENCOUNTER FOR SUPERVISION OF NORMAL FIRST PREGNANCY IN THIRD TRIMESTER: Primary | ICD-10-CM

## 2022-02-18 DIAGNOSIS — O99.013 ANEMIA DURING PREGNANCY IN THIRD TRIMESTER: ICD-10-CM

## 2022-02-18 PROCEDURE — 96365 THER/PROPH/DIAG IV INF INIT: CPT

## 2022-02-18 PROCEDURE — PNV: Performed by: NURSE PRACTITIONER

## 2022-02-18 RX ORDER — SODIUM CHLORIDE 9 MG/ML
20 INJECTION, SOLUTION INTRAVENOUS ONCE
Status: CANCELLED | OUTPATIENT
Start: 2022-02-25

## 2022-02-18 RX ORDER — SODIUM CHLORIDE 9 MG/ML
20 INJECTION, SOLUTION INTRAVENOUS ONCE
Status: COMPLETED | OUTPATIENT
Start: 2022-02-18 | End: 2022-02-18

## 2022-02-18 RX ADMIN — SODIUM CHLORIDE 20 ML/HR: 9 INJECTION, SOLUTION INTRAVENOUS at 11:42

## 2022-02-18 RX ADMIN — IRON SUCROSE 200 MG: 20 INJECTION, SOLUTION INTRAVENOUS at 11:42

## 2022-02-18 NOTE — PROGRESS NOTES
OFFICE VISIT: Denies any N/V, HA, Cramping, VB, LOF, Edema, Domestic Violence, Smoking  + FM  Tolerating PNV  Has had 3 venofer infusions for anemia  Rx for CBC to complete after 5 infusions  Urine neg/neg  RTO 2 weeks or sooner as needed

## 2022-02-25 ENCOUNTER — HOSPITAL ENCOUNTER (OUTPATIENT)
Dept: INFUSION CENTER | Facility: HOSPITAL | Age: 22
Discharge: HOME | End: 2022-02-25
Attending: STUDENT IN AN ORGANIZED HEALTH CARE EDUCATION/TRAINING PROGRAM
Payer: COMMERCIAL

## 2022-02-25 VITALS
OXYGEN SATURATION: 96 % | DIASTOLIC BLOOD PRESSURE: 67 MMHG | TEMPERATURE: 97.5 F | SYSTOLIC BLOOD PRESSURE: 118 MMHG | HEART RATE: 94 BPM | RESPIRATION RATE: 20 BRPM

## 2022-02-25 DIAGNOSIS — O99.013 ANEMIA DURING PREGNANCY IN THIRD TRIMESTER: Primary | ICD-10-CM

## 2022-02-25 PROCEDURE — 96365 THER/PROPH/DIAG IV INF INIT: CPT

## 2022-02-25 RX ORDER — SODIUM CHLORIDE 9 MG/ML
20 INJECTION, SOLUTION INTRAVENOUS ONCE
Status: COMPLETED | OUTPATIENT
Start: 2022-02-25 | End: 2022-02-25

## 2022-02-25 RX ORDER — SODIUM CHLORIDE 9 MG/ML
20 INJECTION, SOLUTION INTRAVENOUS ONCE
Status: CANCELLED | OUTPATIENT
Start: 2022-03-04

## 2022-02-25 RX ADMIN — SODIUM CHLORIDE 20 ML/HR: 9 INJECTION, SOLUTION INTRAVENOUS at 12:23

## 2022-02-25 RX ADMIN — IRON SUCROSE 200 MG: 20 INJECTION, SOLUTION INTRAVENOUS at 12:23

## 2022-02-25 NOTE — PLAN OF CARE
Problem: Potential for Falls  Goal: Patient will remain free of falls  Description: INTERVENTIONS:  - Educate patient/family on patient safety including physical limitations  - Instruct patient to call for assistance with activity   -- Keep Call bell within reach  -Outcome: Progressing

## 2022-03-04 ENCOUNTER — HOSPITAL ENCOUNTER (OUTPATIENT)
Dept: INFUSION CENTER | Facility: HOSPITAL | Age: 22
Discharge: HOME | End: 2022-03-04
Attending: STUDENT IN AN ORGANIZED HEALTH CARE EDUCATION/TRAINING PROGRAM

## 2022-03-08 ENCOUNTER — HOSPITAL ENCOUNTER (OUTPATIENT)
Dept: INFUSION CENTER | Facility: HOSPITAL | Age: 22
Discharge: HOME | End: 2022-03-08
Attending: STUDENT IN AN ORGANIZED HEALTH CARE EDUCATION/TRAINING PROGRAM
Payer: COMMERCIAL

## 2022-03-08 VITALS
HEART RATE: 91 BPM | DIASTOLIC BLOOD PRESSURE: 56 MMHG | TEMPERATURE: 98 F | OXYGEN SATURATION: 98 % | SYSTOLIC BLOOD PRESSURE: 116 MMHG | RESPIRATION RATE: 18 BRPM

## 2022-03-08 DIAGNOSIS — O99.013 ANEMIA DURING PREGNANCY IN THIRD TRIMESTER: Primary | ICD-10-CM

## 2022-03-08 PROCEDURE — 96365 THER/PROPH/DIAG IV INF INIT: CPT

## 2022-03-08 RX ORDER — SODIUM CHLORIDE 9 MG/ML
20 INJECTION, SOLUTION INTRAVENOUS ONCE
Status: CANCELLED | OUTPATIENT
Start: 2022-03-15

## 2022-03-08 RX ORDER — SODIUM CHLORIDE 9 MG/ML
20 INJECTION, SOLUTION INTRAVENOUS ONCE
Status: COMPLETED | OUTPATIENT
Start: 2022-03-08 | End: 2022-03-08

## 2022-03-08 RX ADMIN — SODIUM CHLORIDE 20 ML/HR: 0.9 INJECTION, SOLUTION INTRAVENOUS at 13:43

## 2022-03-08 RX ADMIN — IRON SUCROSE 200 MG: 20 INJECTION, SOLUTION INTRAVENOUS at 13:44

## 2022-03-14 NOTE — PROGRESS NOTES
Please refer to the [facility] ultrasound report in Ob Procedures for additional information regarding today's visit

## 2022-03-15 ENCOUNTER — ULTRASOUND (OUTPATIENT)
Dept: PERINATAL CARE | Facility: OTHER | Age: 22
End: 2022-03-15
Payer: COMMERCIAL

## 2022-03-15 VITALS
BODY MASS INDEX: 26.38 KG/M2 | SYSTOLIC BLOOD PRESSURE: 101 MMHG | DIASTOLIC BLOOD PRESSURE: 67 MMHG | HEART RATE: 88 BPM | WEIGHT: 153.66 LBS

## 2022-03-15 DIAGNOSIS — Z36.4 ULTRASOUND FOR ANTENATAL SCREENING FOR FETAL GROWTH RESTRICTION: ICD-10-CM

## 2022-03-15 DIAGNOSIS — O99.013 ANEMIA DURING PREGNANCY IN THIRD TRIMESTER: Primary | ICD-10-CM

## 2022-03-15 DIAGNOSIS — Z3A.35 35 WEEKS GESTATION OF PREGNANCY: ICD-10-CM

## 2022-03-15 DIAGNOSIS — Z34.03 ENCOUNTER FOR SUPERVISION OF NORMAL FIRST PREGNANCY IN THIRD TRIMESTER: ICD-10-CM

## 2022-03-15 PROCEDURE — 76816 OB US FOLLOW-UP PER FETUS: CPT | Performed by: OBSTETRICS & GYNECOLOGY

## 2022-03-15 NOTE — LETTER
March 15, 2022     Jovanna Carroll MD  [address]    Patient: Luis Roblero   YOB: 2000   Date of Visit: 3/15/2022       Dear Dr Varsha Bush: Thank you for referring Luis Roblero to me for evaluation  Below are my notes for this consultation  If you have questions, please do not hesitate to call me  I look forward to following your patient along with you  Sincerely,        Corwin Cruz MD        CC: No Recipients  Corwin Cruz MD  3/14/2022  8:53 AM  Sign when Signing Visit  Please refer to the [facility] ultrasound report in Ob Procedures for additional information regarding today's visit

## 2022-03-17 ENCOUNTER — TELEPHONE (OUTPATIENT)
Dept: OBGYN CLINIC | Facility: CLINIC | Age: 22
End: 2022-03-17

## 2022-03-17 PROBLEM — Z3A.36 36 WEEKS GESTATION OF PREGNANCY: Status: ACTIVE | Noted: 2021-11-30

## 2022-03-21 NOTE — TELEPHONE ENCOUNTER
Tried calling again and her VM is full  Also sent her a [employer] message in regards to appointments

## 2022-03-22 NOTE — PATIENT INSTRUCTIONS
Pregnancy at 28 to 38 Weeks   AMBULATORY CARE:   Changes happening with your body: You are considered full term at the beginning of 37 weeks  Your breathing may be easier if your baby has moved down into a head-down position  You may need to urinate more often because the baby may be pressing on your bladder  You may also feel more discomfort and get tired easily  Seek care immediately if:   · You develop a severe headache that does not go away  · You have new or increased vision changes, such as blurred or spotted vision  · You have new or increased swelling in your face or hands  · You have vaginal spotting or bleeding  · Your water broke or you feel warm water gushing or trickling from your vagina  Call your obstetrician if:   · You have more than 5 contractions in 1 hour  · You notice any changes in your baby's movements  · You have abdominal cramps, pressure, or tightening  · You have a change in vaginal discharge  · You have chills or a fever  · You have vaginal itching, burning, or pain  · You have yellow, green, white, or foul-smelling vaginal discharge  · You have pain or burning when you urinate, less urine than usual, or pink or bloody urine  · You have questions or concerns about your condition or care  How to care for yourself at this stage of your pregnancy:       · Eat a variety of healthy foods  Healthy foods include fruits, vegetables, whole-grain breads, low-fat dairy foods, beans, lean meats, and fish  Drink liquids as directed  Ask how much liquid to drink each day and which liquids are best for you  Limit caffeine to less than 200 milligrams each day  Limit your intake of fish to 2 servings each week  Choose fish low in mercury such as canned light tuna, shrimp, salmon, cod, or tilapia  Do not  eat fish high in mercury such as swordfish, tilefish, ann marie mackerel, and shark  · Take prenatal vitamins as directed    Your need for certain vitamins and minerals, such as folic acid, increases during pregnancy  Prenatal vitamins provide some of the extra vitamins and minerals you need  Prenatal vitamins may also help to decrease the risk of certain birth defects  · Rest as needed  Put your feet up if you have swelling in your ankles and feet  · Talk to your healthcare provider about exercise  Moderate exercise can help you stay fit  Your healthcare provider will help you plan an exercise program that is safe for you during pregnancy  · Do not smoke  Smoking increases your risk of a miscarriage and other health problems during your pregnancy  Smoking can cause your baby to be born early or weigh less at birth  Ask your healthcare provider for information if you need help quitting  · Do not drink alcohol  Alcohol passes from your body to your baby through the placenta  It can affect your baby's brain development and cause fetal alcohol syndrome (FAS)  FAS is a group of conditions that causes mental, behavior, and growth problems  · Talk to your healthcare provider before you take any medicines  Many medicines may harm your baby if you take them when you are pregnant  Do not take any medicines, vitamins, herbs, or supplements without first talking to your healthcare provider  Never use illegal or street drugs (such as marijuana or cocaine) while you are pregnant  Safety tips during pregnancy:   · Avoid hot tubs and saunas  Do not use a hot tub or sauna while you are pregnant, especially during your first trimester  Hot tubs and saunas may raise your baby's temperature and increase the risk of birth defects  · Avoid toxoplasmosis  This is an infection caused by eating raw meat or being around infected cat feces  It can cause birth defects, miscarriages, and other problems  Wash your hands after you touch raw meat  Make sure any meat is well-cooked before you eat it  Avoid raw eggs and unpasteurized milk   Use gloves or ask someone else to clean your cat's litter box while you are pregnant  · Ask your healthcare provider about travel  The most comfortable time to travel is during the second trimester  Ask your provider if you can travel after 36 weeks  You may not be able to travel in an airplane after 36 weeks  He or she may also recommend you avoid long road trips  Changes happening with your baby:  By 38 weeks, your baby may weigh between 6 and 9 pounds  Your baby may be about 14 inches long from the top of the head to the rump (baby's bottom)  Your baby hears well enough to know your voice  As your baby gets larger, you may feel fewer kicks and more stretching and rolling  Your baby may move into a head-down position  Your baby will also rest lower in your abdomen  What you need to know about prenatal care: Your healthcare provider will check your blood pressure and weight  You may also need the following:  · A urine test  may also be done to check for sugar and protein  These can be signs of gestational diabetes or infection  Protein in your urine may also be a sign of preeclampsia  Preeclampsia is a condition that can develop during week 20 or later of your pregnancy  It causes high blood pressure, and it can cause problems with your kidneys and other organs  · A gestational diabetes screen  may be done  Your healthcare provider may order either a 1-step or 2-step oral glucose tolerance test (OGTT)  ? 1-step OGTT:  Your blood sugar level will be tested after you have not eaten for 8 hours (fasting)  You will then be given a glucose drink  Your level will be tested again 1 hour and 2 hours after you finish the drink  ? 2-step OGTT:  You do not have to fast for the first part of the test  You will have the glucose drink at any time of day  Your blood sugar level will be checked 1 hour later  If your blood sugar is higher than a certain level, another test will be ordered   You will fast and your blood sugar level will be tested  You will have the glucose drink  Your blood will be tested again 1 hour, 2 hours, and 3 hours after you finish the glucose drink  · A blood test  may be done to check for anemia (low iron level)  · A Tdap vaccine  may be recommended by your healthcare provider  · A group B strep test  is a test that is done to check for group B strep infection  Group B strep is a type of bacteria that may be found in the vagina or rectum  It can be passed to your baby during delivery if you have it  Your healthcare provider will take swab your vagina or rectum and send the sample to the lab for tests  · Fundal height  is a measurement of your uterus to check your baby's growth  This number is usually the same as the number of weeks that you have been pregnant  Your healthcare provider may also check your baby's position  · Your baby's heart rate  will be checked  Follow up with your obstetrician as directed:  Write down your questions so you remember to ask them during your visits  © Copyright [employer] 2022 Information is for End User's use only and may not be sold, redistributed or otherwise used for commercial purposes  All illustrations and images included in CareNotes® are the copyrighted property of [employer]  or Sammy Acosta   The above information is an  only  It is not intended as medical advice for individual conditions or treatments  Talk to your doctor, nurse or pharmacist before following any medical regimen to see if it is safe and effective for you

## 2022-03-23 ENCOUNTER — ROUTINE PRENATAL (OUTPATIENT)
Dept: OBGYN CLINIC | Facility: CLINIC | Age: 22
End: 2022-03-23

## 2022-03-23 VITALS — SYSTOLIC BLOOD PRESSURE: 100 MMHG | BODY MASS INDEX: 26.74 KG/M2 | WEIGHT: 155.8 LBS | DIASTOLIC BLOOD PRESSURE: 76 MMHG

## 2022-03-23 DIAGNOSIS — Z34.93 PRENATAL CARE IN THIRD TRIMESTER: Primary | ICD-10-CM

## 2022-03-23 DIAGNOSIS — O99.019 ANEMIA AFFECTING FIRST PREGNANCY: ICD-10-CM

## 2022-03-23 DIAGNOSIS — Z3A.36 36 WEEKS GESTATION OF PREGNANCY: ICD-10-CM

## 2022-03-23 PROCEDURE — PNV: Performed by: STUDENT IN AN ORGANIZED HEALTH CARE EDUCATION/TRAINING PROGRAM

## 2022-03-23 NOTE — PROGRESS NOTES
Tamiko Churchill is a 24 yo  at 36 weeks and 6 days presenting for routine PNC- denies any CTX, LOF or VB  She reports good FM  Urine neg/neg  Having some HA sometimes go away with tylenol but not always- reviewed good PO hydration and magnesium PO in PM  Denies any preeclamptic sx and reviewed to call with preE sx  GBS collected today  CBC slip given as has finished her Fe infusions 2 weeks ago  RTO in 1 week or sooner if needed

## 2022-03-25 LAB
ERYTHROCYTE [DISTWIDTH] IN BLOOD BY AUTOMATED COUNT: 12.7 % (ref 11.7–15.4)
GP B STREP DNA SPEC QL NAA+PROBE: NEGATIVE
HCT VFR BLD AUTO: 32.6 % (ref 34–46.6)
HGB BLD-MCNC: 11.4 G/DL (ref 11.1–15.9)
MCH RBC QN AUTO: 34.4 PG (ref 26.6–33)
MCHC RBC AUTO-ENTMCNC: 35 G/DL (ref 31.5–35.7)
MCV RBC AUTO: 99 FL (ref 79–97)
PLATELET # BLD AUTO: 198 X10E3/UL (ref 150–450)
RBC # BLD AUTO: 3.31 X10E6/UL (ref 3.77–5.28)
WBC # BLD AUTO: 10 X10E3/UL (ref 3.4–10.8)

## 2022-03-31 ENCOUNTER — ROUTINE PRENATAL (OUTPATIENT)
Dept: OBGYN CLINIC | Facility: CLINIC | Age: 22
End: 2022-03-31

## 2022-03-31 VITALS — SYSTOLIC BLOOD PRESSURE: 118 MMHG | DIASTOLIC BLOOD PRESSURE: 70 MMHG | BODY MASS INDEX: 27.12 KG/M2 | WEIGHT: 158 LBS

## 2022-03-31 DIAGNOSIS — Z34.93 THIRD TRIMESTER PREGNANCY: Primary | ICD-10-CM

## 2022-03-31 PROCEDURE — PNV: Performed by: PHYSICIAN ASSISTANT

## 2022-03-31 NOTE — PROGRESS NOTES
Pt getting more uncomfortable  Having increased cramping at night, although not regular  R/with pt s/sx to call with for labor  She does not plan an epidural    She is likely considering Nexplanon implant for pp BC  She does plan to breast feed  She has car seat ready for delivery  Good FM, no vb/lof, no  edema  Some v/n/ha that she recognizes more at night  No smoking     Urine neg/neg   Cx cl/70/-2 posterior

## 2022-04-07 ENCOUNTER — ROUTINE PRENATAL (OUTPATIENT)
Dept: OBGYN CLINIC | Facility: CLINIC | Age: 22
End: 2022-04-07

## 2022-04-07 ENCOUNTER — TELEPHONE (OUTPATIENT)
Dept: OBGYN CLINIC | Facility: CLINIC | Age: 22
End: 2022-04-07

## 2022-04-07 VITALS — SYSTOLIC BLOOD PRESSURE: 104 MMHG | BODY MASS INDEX: 27.15 KG/M2 | DIASTOLIC BLOOD PRESSURE: 80 MMHG | WEIGHT: 158.2 LBS

## 2022-04-07 DIAGNOSIS — Z3A.39 39 WEEKS GESTATION OF PREGNANCY: Primary | ICD-10-CM

## 2022-04-07 PROCEDURE — PNV: Performed by: OBSTETRICS & GYNECOLOGY

## 2022-04-07 NOTE — PROGRESS NOTES
Visit:  Good FM - no LOF / VB - would like IOL soon past EDC - good cervical changes -  Urine tr / neg

## 2022-04-07 NOTE — TELEPHONE ENCOUNTER
----- Message from Lino Oliveira MD sent at 4/7/2022  3:47 PM EDT -----  Would like IOL near [facility] but very ripe and would wait to 40  5 as she had good cx  changes and may go on own

## 2022-04-08 ENCOUNTER — PATIENT MESSAGE (OUTPATIENT)
Dept: OBGYN CLINIC | Facility: CLINIC | Age: 22
End: 2022-04-08

## 2022-04-09 ENCOUNTER — ANESTHESIA EVENT (INPATIENT)
Dept: ANESTHESIOLOGY | Facility: HOSPITAL | Age: 22
DRG: 560 | End: 2022-04-09
Payer: COMMERCIAL

## 2022-04-09 ENCOUNTER — ANESTHESIA (INPATIENT)
Dept: ANESTHESIOLOGY | Facility: HOSPITAL | Age: 22
DRG: 560 | End: 2022-04-09
Payer: COMMERCIAL

## 2022-04-09 ENCOUNTER — HOSPITAL ENCOUNTER (INPATIENT)
Facility: HOSPITAL | Age: 22
LOS: 2 days | Discharge: HOME | DRG: 560 | End: 2022-04-11
Attending: STUDENT IN AN ORGANIZED HEALTH CARE EDUCATION/TRAINING PROGRAM | Admitting: OBSTETRICS & GYNECOLOGY
Payer: COMMERCIAL

## 2022-04-09 PROBLEM — Z37.9 NORMAL LABOR: Status: ACTIVE | Noted: 2022-04-09

## 2022-04-09 LAB
ABO GROUP BLD: NORMAL
AMPHETAMINES SERPL QL SCN: NEGATIVE
BARBITURATES UR QL: NEGATIVE
BASE EXCESS BLDCOA CALC-SCNC: -5.2 MMOL/L (ref 3–11)
BASE EXCESS BLDCOV CALC-SCNC: -5.2 MMOL/L (ref 1–9)
BENZODIAZ UR QL: NEGATIVE
BLD GP AB SCN SERPL QL: NEGATIVE
COCAINE UR QL: NEGATIVE
ERYTHROCYTE [DISTWIDTH] IN BLOOD BY AUTOMATED COUNT: 13.5 % (ref 11.6–15.1)
HCO3 BLDCOA-SCNC: 24.1 MMOL/L (ref 17.3–27.3)
HCO3 BLDCOV-SCNC: 21.9 MMOL/L (ref 12.2–28.6)
HCT VFR BLD AUTO: 38.6 % (ref 34.8–46.1)
HGB BLD-MCNC: 12.6 G/DL (ref 11.5–15.4)
HOLD SPECIMEN: NORMAL
MCH RBC QN AUTO: 34 PG (ref 26.8–34.3)
MCHC RBC AUTO-ENTMCNC: 32.6 G/DL (ref 31.4–37.4)
MCV RBC AUTO: 104 FL (ref 82–98)
METHADONE UR QL: NEGATIVE
O2 CT VFR BLDCOA CALC: 4.9 ML/DL
OPIATES UR QL SCN: NEGATIVE
OXYCODONE+OXYMORPHONE UR QL SCN: NEGATIVE
OXYHGB MFR BLDCOA: 20.6 %
OXYHGB MFR BLDCOV: 39.5 %
PCO2 BLDCOA: 61.7 MM[HG] (ref 30–60)
PCO2 BLDCOV: 48 MM HG (ref 27–43)
PCP UR QL: NEGATIVE
PH BLDCOA: 7.21 [PH] (ref 7.23–7.43)
PH BLDCOV: 7.28 [PH] (ref 7.19–7.49)
PLATELET # BLD AUTO: 197 THOUSANDS/UL (ref 149–390)
PMV BLD AUTO: 10.5 FL (ref 8.9–12.7)
PO2 BLDCOA: 13.2 MM HG (ref 5–25)
PO2 BLDCOV: 18.9 MM HG (ref 15–45)
RBC # BLD AUTO: 3.71 MILLION/UL (ref 3.81–5.12)
RH BLD: POSITIVE
SAO2 % BLDCOV: 9.1 ML/DL
SPECIMEN EXPIRATION DATE: NORMAL
THC UR QL: NEGATIVE
WBC # BLD AUTO: 11.5 THOUSAND/UL (ref 4.31–10.16)

## 2022-04-09 PROCEDURE — 0UQG7ZZ REPAIR VAGINA, VIA NATURAL OR ARTIFICIAL OPENING: ICD-10-PCS | Performed by: OBSTETRICS & GYNECOLOGY

## 2022-04-09 PROCEDURE — 59409 OBSTETRICAL CARE: CPT | Performed by: STUDENT IN AN ORGANIZED HEALTH CARE EDUCATION/TRAINING PROGRAM

## 2022-04-09 PROCEDURE — 99215 OFFICE O/P EST HI 40 MIN: CPT

## 2022-04-09 PROCEDURE — 86592 SYPHILIS TEST NON-TREP QUAL: CPT

## 2022-04-09 PROCEDURE — 99024 POSTOP FOLLOW-UP VISIT: CPT | Performed by: OBSTETRICS & GYNECOLOGY

## 2022-04-09 PROCEDURE — 86901 BLOOD TYPING SEROLOGIC RH(D): CPT

## 2022-04-09 PROCEDURE — 99024 POSTOP FOLLOW-UP VISIT: CPT | Performed by: STUDENT IN AN ORGANIZED HEALTH CARE EDUCATION/TRAINING PROGRAM

## 2022-04-09 PROCEDURE — 85027 COMPLETE CBC AUTOMATED: CPT

## 2022-04-09 PROCEDURE — 4A1HXCZ MONITORING OF PRODUCTS OF CONCEPTION, CARDIAC RATE, EXTERNAL APPROACH: ICD-10-PCS | Performed by: OBSTETRICS & GYNECOLOGY

## 2022-04-09 PROCEDURE — NC001 PR NO CHARGE: Performed by: STUDENT IN AN ORGANIZED HEALTH CARE EDUCATION/TRAINING PROGRAM

## 2022-04-09 PROCEDURE — 86900 BLOOD TYPING SEROLOGIC ABO: CPT

## 2022-04-09 PROCEDURE — 80307 DRUG TEST PRSMV CHEM ANLYZR: CPT | Performed by: STUDENT IN AN ORGANIZED HEALTH CARE EDUCATION/TRAINING PROGRAM

## 2022-04-09 PROCEDURE — 86850 RBC ANTIBODY SCREEN: CPT

## 2022-04-09 PROCEDURE — 82805 BLOOD GASES W/O2 SATURATION: CPT | Performed by: STUDENT IN AN ORGANIZED HEALTH CARE EDUCATION/TRAINING PROGRAM

## 2022-04-09 RX ORDER — IBUPROFEN 600 MG/1
600 TABLET ORAL EVERY 6 HOURS
Status: DISCONTINUED | OUTPATIENT
Start: 2022-04-09 | End: 2022-04-11 | Stop reason: HOSPADM

## 2022-04-09 RX ORDER — DIPHENHYDRAMINE HCL 25 MG
25 TABLET ORAL EVERY 6 HOURS PRN
Status: DISCONTINUED | OUTPATIENT
Start: 2022-04-09 | End: 2022-04-11 | Stop reason: HOSPADM

## 2022-04-09 RX ORDER — ACETAMINOPHEN 325 MG/1
650 TABLET ORAL EVERY 4 HOURS PRN
Status: DISCONTINUED | OUTPATIENT
Start: 2022-04-09 | End: 2022-04-10

## 2022-04-09 RX ORDER — PROMETHAZINE HYDROCHLORIDE 25 MG/ML
12.5 INJECTION, SOLUTION INTRAMUSCULAR; INTRAVENOUS ONCE
Status: COMPLETED | OUTPATIENT
Start: 2022-04-09 | End: 2022-04-09

## 2022-04-09 RX ORDER — DIAPER,BRIEF,INFANT-TODD,DISP
1 EACH MISCELLANEOUS DAILY PRN
Status: DISCONTINUED | OUTPATIENT
Start: 2022-04-09 | End: 2022-04-11 | Stop reason: HOSPADM

## 2022-04-09 RX ORDER — SODIUM CHLORIDE, SODIUM LACTATE, POTASSIUM CHLORIDE, CALCIUM CHLORIDE 600; 310; 30; 20 MG/100ML; MG/100ML; MG/100ML; MG/100ML
125 INJECTION, SOLUTION INTRAVENOUS CONTINUOUS
Status: DISCONTINUED | OUTPATIENT
Start: 2022-04-09 | End: 2022-04-09

## 2022-04-09 RX ORDER — BUTORPHANOL TARTRATE 1 MG/ML
1 INJECTION, SOLUTION INTRAMUSCULAR; INTRAVENOUS ONCE
Status: COMPLETED | OUTPATIENT
Start: 2022-04-09 | End: 2022-04-09

## 2022-04-09 RX ORDER — LIDOCAINE HYDROCHLORIDE 20 MG/ML
INJECTION, SOLUTION EPIDURAL; INFILTRATION; INTRACAUDAL; PERINEURAL AS NEEDED
Status: DISCONTINUED | OUTPATIENT
Start: 2022-04-09 | End: 2022-04-09 | Stop reason: HOSPADM

## 2022-04-09 RX ORDER — OXYTOCIN/RINGER'S LACTATE 30/500 ML
PLASTIC BAG, INJECTION (ML) INTRAVENOUS
Status: DISPENSED
Start: 2022-04-09 | End: 2022-04-09

## 2022-04-09 RX ORDER — LIDOCAINE HYDROCHLORIDE AND EPINEPHRINE 15; 5 MG/ML; UG/ML
INJECTION, SOLUTION EPIDURAL
Status: COMPLETED | OUTPATIENT
Start: 2022-04-09 | End: 2022-04-09

## 2022-04-09 RX ADMIN — LIDOCAINE HYDROCHLORIDE AND EPINEPHRINE 3 ML: 15; 5 INJECTION, SOLUTION EPIDURAL at 10:52

## 2022-04-09 RX ADMIN — BUTORPHANOL TARTRATE 1 MG: 1 INJECTION, SOLUTION INTRAMUSCULAR; INTRAVENOUS at 09:22

## 2022-04-09 RX ADMIN — WITCH HAZEL 1 PAD: 500 SOLUTION RECTAL; TOPICAL at 16:10

## 2022-04-09 RX ADMIN — HYDROCORTISONE 1 APPLICATION: 1 CREAM TOPICAL at 16:10

## 2022-04-09 RX ADMIN — IBUPROFEN 600 MG: 600 TABLET ORAL at 21:30

## 2022-04-09 RX ADMIN — SODIUM CHLORIDE, SODIUM LACTATE, POTASSIUM CHLORIDE, AND CALCIUM CHLORIDE 125 ML/HR: .6; .31; .03; .02 INJECTION, SOLUTION INTRAVENOUS at 09:25

## 2022-04-09 RX ADMIN — PROMETHAZINE HYDROCHLORIDE 12.5 MG: 25 INJECTION INTRAMUSCULAR; INTRAVENOUS at 09:14

## 2022-04-09 RX ADMIN — BENZOCAINE AND LEVOMENTHOL 1 APPLICATION: 200; 5 SPRAY TOPICAL at 16:10

## 2022-04-09 RX ADMIN — LIDOCAINE HYDROCHLORIDE 5 ML: 20 INJECTION, SOLUTION EPIDURAL; INFILTRATION; INTRACAUDAL at 10:57

## 2022-04-09 RX ADMIN — LIDOCAINE HYDROCHLORIDE AND EPINEPHRINE 2 ML: 15; 5 INJECTION, SOLUTION EPIDURAL at 10:53

## 2022-04-09 RX ADMIN — ROPIVACAINE HYDROCHLORIDE: 2 INJECTION, SOLUTION EPIDURAL; INFILTRATION at 11:19

## 2022-04-09 RX ADMIN — ACETAMINOPHEN 650 MG: 325 TABLET ORAL at 18:30

## 2022-04-09 NOTE — ANESTHESIA PROCEDURE NOTES
Epidural Block    Start time: 4/9/2022 10:50 AM  Reason for block: procedure for pain and at surgeon's request  Staffing  Performed: CRNA   Anesthesiologist: Boogie Pyle MD  Resident/CRNA: Melinda Raya CRNA  Preanesthetic Checklist  Completed: patient identified, IV checked, site marked, risks and benefits discussed, surgical consent, monitors and equipment checked, pre-op evaluation and timeout performed  Epidural  Patient position: sitting  Prep: ChloraPrep  Patient monitoring: cardiac monitor and frequent blood pressure checks  Approach: midline  Location: lumbar  Injection technique: DESIREE saline  Needle  Needle type: Tuohy   Needle gauge: 17 G  Catheter type: side hole  Catheter size: 19 G  Catheter at skin depth: 10 cm  Catheter securement method: clear occlusive dressing  Test dose: negativelidocaine 1 5% with epinephrine 1:200,000 test dose, 3 mL  Assessment  Number of attempts: 1negative aspiration for CSF, negative aspiration for heme and no paresthesia on injection  patient tolerated the procedure well with no immediate complications  Additional Notes  Single skin pass  Catheter passed easily to 20cm without parasthesia/heme  Negative test dose

## 2022-04-09 NOTE — L&D DELIVERY NOTE
DELIVERY NOTE  Aniket Muhammad 25 y o  female MRN: [id number]  Unit/Bed#: -01 Encounter: [id number]    Obstetrician:    Dr Ranulfo Lopez MD    Assistant:   Dr Mike Vale    Pre-Delivery Diagnosis:   Patient Active Problem List   Diagnosis    Family history of drug addiction    39 weeks gestation of pregnancy    Asthma during pregnancy    Depression affecting pregnancy    Anemia during pregnancy in third trimester    Normal labor     Post-Delivery Diagnosis:   Same as above - Delivered  Viable female fetus  Bilateral vaginal lacerations    Procedure:  Spontaneous vaginal delivery  Repair bilateral vaginal spontaneous laceration    Findings:  1  Viable female  delivered on 22 at 1243 weighing 7 lb 3 oz,  Apgar scores of 8 at one minute and 9 at five minutes  2  Spontaneous delivery of placenta with centrally inserted 3-vessel cord  3  Clear amniotic fluid  4  Bilateral vaginal laceration, repaired with 3-0 Vicryl rapide    Specimens:   Cord blood obtained   Placenta; normal appearing, central insertion, intact   Arterial and venous blood gases (below)     Gases:  Umbilical Cord Venous Blood Gas:  Results from last 7 days   Lab Units 22  1250   PH COV  7 277   PCO2 COV mm HG 48 0*   HCO3 COV mmol/L 21 9   BASE EXC COV mmol/L -5 2*   O2 CT CD VB mL/dL 9 1   O2 HGB, VENOUS CORD % 10 3     Umbilical Cord Arterial Blood Gas:  Results from last 7 days   Lab Units 22  1250   PH COA  7 209*   PCO2 COA  61 7*   PO2 COA mm HG 13 2   HCO3 COA mmol/L 24 1   BASE EXC COA mmol/L -5 2*   O2 CONTENT CORD ART ml/dl 4 9   O2 HGB, ARTERIAL CORD % 20 6       Quantitative Blood Loss:   192 mL           Complications:    None       Brief labor course:   Aniket Muhammad is a 25 y o   at 44w2d  She presented to labor and delivery for contractions  Her pregnancy was complicated by asthma, depression, anemia, and family history of drug addiction  On exam in triage she was noted to be 3/80/-2    She made change in triage to 3 5/80/-1 and was extremely uncomfortable  She received stable and Phenergan for pain relief  She was admitted for labor  Upon moving from triage to a labor room she made change from her previous check to 5/80/-1 in approximately 1 hour  Procedure Details      Description of procedure     After pushing for 8 minutes, on 22 at 1243 patient delivered a viable female , Apgars of 8 (1 min) and 9 (5 min)  The fetal vertex delivered spontaneously  There was no nuchal cord  With the assistance of maternal expulsive efforts and gentle downward traction of the fetal head, the anterior (right) shoulder was delivered without difficulty, followed by the remainder of the infant's body and contralateral arm  After delivery of the , delayed clamping of the umbilical cord was undertaken for 30 seconds  The  was noted to have good tone and cry spontaneously  There was no apparent injury to the   The cord was then doubly clamped and cut and the  was passed off to  staff for routine care  Umbilical cord blood and umbilical artery and venous gases were collected  Placenta was delivered with fundal massage and gentle traction on the cord with active management of the third stage of labor  Placenta delivered intact with a 3-vessel cord  Active management of the third stage of labor was undertaken with IV pitocin at 250 milliunits/min  A fundal exam was performed and fundus was noted to be firm and at the level fo the umbillicus  Bleeding was noted to be under control   Outcome:  Living  with APGARS 8, 9 at 1 and 5 minutes, respectively   weight pending    Perineal Inspection/Laceration Repair    The vagina, cervix, perineum, and rectum were inspected and there was noted to be bilateral vaginal lacerations that required repair   Both lacerations were repaired with 3-0 Vicryl rapid in a running non-locked fashion to reapproximate the laceration  Excellent hemostasis and cosmesis was achieved  Conclusion:  Mother and baby are currently recovering nicely in stable condition  Attending Supervision:   Dr Chey Infante MD was present for the entire procedure  Jolene Tavera MD  OB/GYN PGY-1  4/9/2022  1:07 PM

## 2022-04-09 NOTE — OB LABOR/OXYTOCIN SAFETY PROGRESS
Labor Progress Note - William Duncan 25 y o  female MRN: [id number]    Unit/Bed#: -01 Encounter: [id number]       Contraction Frequency (minutes): 2-3 per pt  Contraction Quality: Moderate  Tachysystole: No   Cervical Dilation: 7        Cervical Effacement: 90  Fetal Station: 0  Baseline Rate: 130 bpm  Fetal Heart Rate: 123 BPM  FHR Category: Category I    Vital Signs:   Vitals:    04/09/22 0922   BP:    Pulse:    Resp:    Temp: 98 °F (36 7 °C)       Notes/comments:       Lianet is more uncomfortable and screaming with her contractions  On cervical exam she may 7/90/0  FHT is category 1  Patient is requesting an epidural at this time  D/W Dr Carla Quezada         [place], [place] 4/9/2022 10:36 AM

## 2022-04-09 NOTE — ASSESSMENT & PLAN NOTE
Admit  Follow up CBC, RPR, Blood Type  Plan for expectant management  Method of contraception:  Undecided  GBS negative status   Analgesia and/or epidural at patient request  Anticipate

## 2022-04-09 NOTE — ANESTHESIA PREPROCEDURE EVALUATION
Procedure:  LABOR ANALGESIA    Relevant Problems   GYN   (+) 39 weeks gestation of pregnancy      HEMATOLOGY   (+) Anemia during pregnancy in third trimester      NEURO/PSYCH   (+) Depression affecting pregnancy      PULMONARY   (+) Asthma during pregnancy        Physical Exam    Airway    Mallampati score: II  TM Distance: >3 FB  Neck ROM: full     Dental   No notable dental hx     Cardiovascular      Pulmonary      Other Findings        Anesthesia Plan  ASA Score- 2     Anesthesia Type- epidural with ASA Monitors  Additional Monitors:   Airway Plan:     Comment: Patient examined, history reviewed  Labor epidural explained, risks and benefits discussed  Consent has been signed          Plan Factors-Exercise tolerance (METS): >4 METS  Chart reviewed  Patient summary reviewed  Induction-     Postoperative Plan-     Informed Consent- Anesthetic plan and risks discussed with patient  I personally reviewed this patient with the CRNA  Discussed and agreed on the Anesthesia Plan with the CRNA  Bimal Acosta

## 2022-04-09 NOTE — PROGRESS NOTES
Triage Note - OB  Joseph Bowers 25 y o  female MRN: [id number]  Unit/Bed#:   Encounter: [id number]    OB TRIAGE NOTE  Joseph Bowers  [id number]  2022  9:27 AM  /    ASSESS:  22 y o   39w2d with contractions  Exam changed from 2cm previously to 3cm today  Patient is moderately uncomfortable  PLAN  · 2h recheck    D/w Dr Belgica James  ______________    SUBJECTIVE    AISHA: Estimated Date of Delivery: 22    HPI Chronology:  25 y o   39w2d presents with complaint of contractions  She reports that the contractions started this morning at around 5:45AM and the contractions are getting worse  Contractions: yes  Leakage: no  Bleeding: no  Fetal Movement: yes    Vitals:   /82   Pulse 83   Temp 97 6 °F (36 4 °C) (Oral)   Resp 18   LMP 2021   There is no height or weight on file to calculate BMI  Review of Systems   Constitutional: Negative for diaphoresis and fever  HENT: Negative for hearing loss, nosebleeds, tinnitus and trouble swallowing  Eyes: Negative for photophobia and visual disturbance  Respiratory: Negative for apnea, shortness of breath and wheezing  Cardiovascular: Negative for chest pain and palpitations  Gastrointestinal: Negative for blood in stool and vomiting  Endocrine: Negative for polydipsia and polyphagia  Genitourinary: Negative for dysuria and hematuria  Musculoskeletal: Negative for arthralgias, gait problem and myalgias  Skin: Negative for color change and pallor  Neurological: Negative for dizziness, seizures, facial asymmetry, speech difficulty, numbness and headaches  Psychiatric/Behavioral: Negative for agitation, behavioral problems and confusion  Physical Exam  HENT:      Head: Normocephalic and atraumatic  Eyes:      Extraocular Movements: Extraocular movements intact  Pupils: Pupils are equal, round, and reactive to light  Cardiovascular:      Rate and Rhythm: Normal rate        Pulses: Normal pulses  Pulmonary:      Effort: No respiratory distress  Abdominal:      Tenderness: There is no abdominal tenderness  Cervix: 3/80/-2       FHT:  Baseline Rate: 130 bpm  Variability: Moderate 6-25 bpm  Accelerations: 15 x 15 or greater  Decelerations:  (possible variables; pt standing up and monitor spotty)  FHR Category: Category I  TOCO:   Contraction Frequency (minutes): 2-3 per pt  Contraction Duration (seconds): 50-70  Contraction Quality: Moderate    Labs:   Recent Results (from the past 24 hour(s))   CBC    Collection Time: 04/09/22  9:00 AM   Result Value Ref Range    WBC 11 50 (H) 4 31 - 10 16 Thousand/uL    RBC 3 71 (L) 3 81 - 5 12 Million/uL    Hemoglobin 12 6 11 5 - 15 4 g/dL    Hematocrit 38 6 34 8 - 46 1 %     (H) 82 - 98 fL    MCH 34 0 26 8 - 34 3 pg    MCHC 32 6 31 4 - 37 4 g/dL    RDW 13 5 11 6 - 15 1 %    Platelets 859 364 - 082 Thousands/uL    MPV 10 5 8 9 - 12 7 fL       Lab, Imaging and other studies: I have personally reviewed pertinent reports          Leanne Monson MD  4/9/2022  9:27 AM

## 2022-04-09 NOTE — DISCHARGE SUMMARY
Discharge Summary - OB/GYN  Yasmin Marcos 25 y o  female MRN: [id number]  Unit/Bed#: -01 Encounter: [id number]    Admission Date: 2022     Discharge Date: 22    Admitting Attending: Bianca Mead MD    Delivering Attending: Breanne Hooper MD    Discharging Attending: Dr Vinny Acevedo Diagnosis: Pregnancy at 39w2d    Secondary Diagnosis:   Patient Active Problem List   Diagnosis    Family history of drug addiction    39 weeks gestation of pregnancy    Asthma during pregnancy    Depression affecting pregnancy    Anemia during pregnancy in third trimester     Procedures: spontaneous vaginal delivery    Anesthesia: epidural    Hospital course: Yasmin Marcos is a 25 y o   at 39w2d  She presented to labor and delivery for contractions  Her pregnancy was complicated by asthma, depression, anemia, and family history of drug addiction  On exam in triage she was noted to be 3/80/-2  She made change in triage to 3 5/80/-1 and was extremely uncomfortable  She received stable and Phenergan for pain relief  She was admitted for labor  Upon moving from triage to a labor room she made change from her previous check to 5/80/-1 in approximately 1 hour  She delivered a viable female  on 2022 at 1243  Weight 7lbs 3oz via normal spontaneous vaginal delivery, with vacuum assistance  She sustained a bilateral vaginal laceration during delivery which was adequately repaired  Apgars were 8 (1 min) and 9 (5 min)   was transferred to  nursery  Patient tolerated the procedure well  Her post-delivery course was uncomplicated  Her postpartum pain was well controlled with oral analgesics  On day of discharge, she was ambulating and able to reasonably perform all ADLs  She was voiding and had appropriate bowel function  Pain was well controlled  She was discharged home on postpartum day #2 without complications   Patient was instructed to follow up with her OB as an outpatient and was given appropriate warnings to call provider if she develops signs of infection or uncontrolled pain  Complications: none apparent    Condition at discharge: good     Discharge instructions/Information to patient and family:   -Do not place anything (no partner, tampons or douche) in your vagina for 6 weeks  -You may walk for exercise for the first 6 weeks then gradually return to your usual activities    -Please do not drive for 1 week if you have no stitches and for 2 weeks if you have stitches or underwent a  delivery     -You may take baths or shower per your preference    -Please look at your bust (breasts) in the mirror daily and call your doctor for redness or tenderness or increased warmth  - If you have had a  section please look at your incision daily as well and call provider for increasing redness or steady drainage from the incision    -Please call your doctor's office if temperature > 100 4*F or 38* C, worsening pain or a foul discharge   -See after visit summary for more information for patient and family  Provisions for Follow-Up Care:  See after visit summary for information related to follow-up care and any pertinent home health orders  Disposition: See After Visit Summary for discharge disposition information  Planned Readmission: No    Discharge medications and instructions:   Prenatal vitamin daily for 6 months or the duration of nursing, whichever is longer    Motrin 600 mg orally every 6 hours as needed for pain  Tylenol (over the counter) per bottle directions as needed for pain  Hydrocortisone cream 1% (over the counter) applied 1-2x daily to perineum as needed  Witch hazel pads for perineal discomfort as needed    Umesh Lakhani MD

## 2022-04-09 NOTE — ANESTHESIA POSTPROCEDURE EVALUATION
Post-Op Assessment Note    CV Status:  Stable  Pain Score: 2    Pain management: adequate     Mental Status:  Alert and awake   Hydration Status:  Euvolemic   PONV Controlled:  Controlled   Airway Patency:  Patent      Post Op Vitals Reviewed: Yes      Staff: Anesthesiologist, CRNA     Post-op block assessment: catheter intact and no complications      No complications documented      BP      Temp      Pulse     Resp      SpO2
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
## 2022-04-09 NOTE — OB LABOR/OXYTOCIN SAFETY PROGRESS
Labor Progress Note - Doris Whitten 25 y o  female MRN: [id number]    Unit/Bed#: -01 Encounter: [id number]       Contraction Frequency (minutes): 2-3 per pt  Contraction Quality: Moderate  Tachysystole: No   Cervical Dilation: 5        Cervical Effacement: 80  Fetal Station: -1  Baseline Rate: 130 bpm  Fetal Heart Rate: 123 BPM  FHR Category: Category I    Vital Signs:   Vitals:    04/09/22 0833   BP: 128/82   Pulse: 83   Resp:    Temp:        Notes/comments:       Lianet is uncomfortable and had just received stadol and phenergan  On cervical exam she made change to 5/80/-1, still intact  FHT category I  Plan for expectant management   D/W Dr James Ramos MD 4/9/2022 9:23 AM

## 2022-04-09 NOTE — PLAN OF CARE
Problem: Knowledge Deficit  Goal: Verbalizes understanding of labor plan  Description: Assess patient/family/caregiver's baseline knowledge level and ability to understand information  Provide education via patient/family/caregiver's preferred learning method at appropriate level of understanding  1  Provide teaching at level of understanding  2  Provide teaching via preferred learning method(s)  Outcome: Progressing  Goal: Patient/family/caregiver demonstrates understanding of disease process, treatment plan, medications, and discharge instructions  Description: Complete learning assessment and assess knowledge base  Interventions:  - Provide teaching at level of understanding  - Provide teaching via preferred learning methods  Outcome: Progressing     Problem: Labor & Delivery  Goal: Manages discomfort  Description: Assess and monitor for signs and symptoms of discomfort  Assess patient's pain level regularly and per hospital policy  Administer medications as ordered  Support use of nonpharmacological methods to help control pain such as distraction, imagery, relaxation, and application of heat and cold  Collaborate with interdisciplinary team and patient to determine appropriate pain management plan  1  Include patient in decisions related to comfort  2  Offer non-pharmacological pain management interventions  3  Report ineffective pain management to physician  Outcome: Progressing  Goal: Patient vital signs are stable  Description: 1  Assess vital signs - vaginal delivery    Outcome: Progressing     Problem: PAIN - ADULT  Goal: Verbalizes/displays adequate comfort level or baseline comfort level  Description: Interventions:  - Encourage patient to monitor pain and request assistance  - Assess pain using appropriate pain scale  - Administer analgesics based on type and severity of pain and evaluate response  - Implement non-pharmacological measures as appropriate and evaluate response  - Consider cultural and social influences on pain and pain management  - Notify physician/advanced practitioner if interventions unsuccessful or patient reports new pain  Outcome: Progressing     Problem: INFECTION - ADULT  Goal: Absence or prevention of progression during hospitalization  Description: INTERVENTIONS:  - Assess and monitor for signs and symptoms of infection  - Monitor lab/diagnostic results  - Monitor all insertion sites, i e  indwelling lines, tubes, and drains  - Monitor endotracheal if appropriate and nasal secretions for changes in amount and color  - [place] appropriate cooling/warming therapies per order  - Administer medications as ordered  - Instruct and encourage patient and family to use good hand hygiene technique  - Identify and instruct in appropriate isolation precautions for identified infection/condition  Outcome: Progressing  Goal: Absence of fever/infection during neutropenic period  Description: INTERVENTIONS:  - Monitor WBC    Outcome: Progressing     Problem: SAFETY ADULT  Goal: Patient will remain free of falls  Description: INTERVENTIONS:  - Educate patient/family on patient safety including physical limitations  - Instruct patient to call for assistance with activity   - Consult OT/PT to assist with strengthening/mobility   - Keep Call bell within reach  - Keep bed low and locked with side rails adjusted as appropriate  - Keep care items and personal belongings within reach  - Initiate and maintain comfort rounds  - Make Fall Risk Sign visible to staff  - Offer Toileting every    Hours, in advance of need  - Initiate/Maintain   alarm  - Obtain necessary fall risk management equipment:     - Apply yellow socks and bracelet for high fall risk patients  - Consider moving patient to room near nurses station  Outcome: Progressing  Goal: Maintain or return to baseline ADL function  Description: INTERVENTIONS:  -  Assess patient's ability to carry out ADLs; assess patient's baseline for ADL function and identify physical deficits which impact ability to perform ADLs (bathing, care of mouth/teeth, toileting, grooming, dressing, etc )  - Assess/evaluate cause of self-care deficits   - Assess range of motion  - Assess patient's mobility; develop plan if impaired  - Assess patient's need for assistive devices and provide as appropriate  - Encourage maximum independence but intervene and supervise when necessary  - Involve family in performance of ADLs  - Assess for home care needs following discharge   - Consider OT consult to assist with ADL evaluation and planning for discharge  - Provide patient education as appropriate  Outcome: Progressing  Goal: Maintains/Returns to pre admission functional level  Description: INTERVENTIONS:  - Perform BMAT or MOVE assessment daily    - Set and communicate daily mobility goal to care team and patient/family/caregiver  - Collaborate with rehabilitation services on mobility goals if consulted  - Perform Range of Motion    times a day  - Reposition patient every    hours    - Dangle patient    times a day  - Stand patient    times a day  - Ambulate patient    times a day  - Out of bed to chair    times a day   - Out of bed for meals      times a day  - Out of bed for toileting  - Record patient progress and toleration of activity level   Outcome: Progressing     Problem: DISCHARGE PLANNING  Goal: Discharge to home or other facility with appropriate resources  Description: INTERVENTIONS:  - Identify barriers to discharge w/patient and caregiver  - Arrange for needed discharge resources and transportation as appropriate  - Identify discharge learning needs (meds, wound care, etc )  - Arrange for interpretive services to assist at discharge as needed  - Refer to Case Management Department for coordinating discharge planning if the patient needs post-hospital services based on physician/advanced practitioner order or complex needs related to functional status, cognitive ability, or social support system  Outcome: Progressing

## 2022-04-09 NOTE — OB LABOR/OXYTOCIN SAFETY PROGRESS
Labor Progress Note - Katherine Dae 25 y o  female MRN: [id number]    Unit/Bed#: -01 Encounter: [id number]       Contraction Frequency (minutes): 2-4  Contraction Quality: Moderate  Tachysystole: No   Cervical Dilation: Lip/rim (Comment)        Cervical Effacement: 100  Fetal Station: 0  Baseline Rate: 115 bpm  Fetal Heart Rate: 118 BPM  FHR Category: Category I      Vital Signs:   Vitals:    04/09/22 1045   BP: 110/71   Pulse:    Resp:    Temp:        Notes/comments:       Lianet was able to get her epidural   On cervical exam she SROM'd and was thought to be completely dilated but is actually 9 5/100/0 after attempt at pushing  Will recheck as clinically indicated  FHT is category 2 with intermittent variable decelerations  D/W Dr Alma Olvera MD 4/9/2022 11:06 AM

## 2022-04-10 LAB — RPR SER QL: NORMAL

## 2022-04-10 PROCEDURE — 99024 POSTOP FOLLOW-UP VISIT: CPT | Performed by: OBSTETRICS & GYNECOLOGY

## 2022-04-10 RX ORDER — ACETAMINOPHEN 325 MG/1
650 TABLET ORAL EVERY 4 HOURS PRN
Status: DISCONTINUED | OUTPATIENT
Start: 2022-04-10 | End: 2022-04-11 | Stop reason: HOSPADM

## 2022-04-10 RX ADMIN — ACETAMINOPHEN 650 MG: 325 TABLET ORAL at 13:21

## 2022-04-10 RX ADMIN — IBUPROFEN 600 MG: 600 TABLET ORAL at 22:13

## 2022-04-10 RX ADMIN — IBUPROFEN 600 MG: 600 TABLET ORAL at 09:58

## 2022-04-10 RX ADMIN — IBUPROFEN 600 MG: 600 TABLET ORAL at 03:54

## 2022-04-10 RX ADMIN — IBUPROFEN 600 MG: 600 TABLET ORAL at 16:31

## 2022-04-10 NOTE — PROGRESS NOTES
Called to the bedside for patient complaining of right calf pain with positive Homans sign per nursing  Patient states that she has no other complaints aside from the right calf tenderness which she has just noticed  She denies chest pain, shortness of breath, fever, chills, and abdominal pain  Fundus is firm at the level of the umbilicus  Lungs are clear to auscultation and heart rate is regular  Vitals are stable  Plan to get a venous duplex study to rule out a DVT  D/W Dr Chucho Ponce       [address] [place] [address], MD

## 2022-04-10 NOTE — PLAN OF CARE
Problem: Knowledge Deficit  Goal: Verbalizes understanding of labor plan  Description: Assess patient/family/caregiver's baseline knowledge level and ability to understand information  Provide education via patient/family/caregiver's preferred learning method at appropriate level of understanding  1  Provide teaching at level of understanding  2  Provide teaching via preferred learning method(s)  Outcome: Progressing  Goal: Patient/family/caregiver demonstrates understanding of disease process, treatment plan, medications, and discharge instructions  Description: Complete learning assessment and assess knowledge base  Interventions:  - Provide teaching at level of understanding  - Provide teaching via preferred learning methods  Outcome: Progressing     Problem: Labor & Delivery  Goal: Manages discomfort  Description: Assess and monitor for signs and symptoms of discomfort  Assess patient's pain level regularly and per hospital policy  Administer medications as ordered  Support use of nonpharmacological methods to help control pain such as distraction, imagery, relaxation, and application of heat and cold  Collaborate with interdisciplinary team and patient to determine appropriate pain management plan  1  Include patient in decisions related to comfort  2  Offer non-pharmacological pain management interventions  3  Report ineffective pain management to physician  Outcome: Progressing  Goal: Patient vital signs are stable  Description: 1  Assess vital signs - vaginal delivery    Outcome: Progressing     Problem: PAIN - ADULT  Goal: Verbalizes/displays adequate comfort level or baseline comfort level  Description: Interventions:  - Encourage patient to monitor pain and request assistance  - Assess pain using appropriate pain scale  - Administer analgesics based on type and severity of pain and evaluate response  - Implement non-pharmacological measures as appropriate and evaluate response  - Consider cultural and social influences on pain and pain management  - Notify physician/advanced practitioner if interventions unsuccessful or patient reports new pain  Outcome: Progressing     Problem: INFECTION - ADULT  Goal: Absence or prevention of progression during hospitalization  Description: INTERVENTIONS:  - Assess and monitor for signs and symptoms of infection  - Monitor lab/diagnostic results  - Monitor all insertion sites, i e  indwelling lines, tubes, and drains  - Monitor endotracheal if appropriate and nasal secretions for changes in amount and color  - [place] appropriate cooling/warming therapies per order  - Administer medications as ordered  - Instruct and encourage patient and family to use good hand hygiene technique  - Identify and instruct in appropriate isolation precautions for identified infection/condition  Outcome: Progressing  Goal: Absence of fever/infection during neutropenic period  Description: INTERVENTIONS:  - Monitor WBC    Outcome: Progressing     Problem: SAFETY ADULT  Goal: Patient will remain free of falls  Description: INTERVENTIONS:  - Educate patient/family on patient safety including physical limitations  - Instruct patient to call for assistance with activity   - Consult OT/PT to assist with strengthening/mobility   - Keep Call bell within reach  - Keep bed low and locked with side rails adjusted as appropriate  - Keep care items and personal belongings within reach  - Initiate and maintain comfort rounds  - Make Fall Risk Sign visible to staff  - Offer Toileting every  Hours, in advance of need  - Initiate/Maintain alarm  - Obtain necessary fall risk management equipment:   - Apply yellow socks and bracelet for high fall risk patients  - Consider moving patient to room near nurses station  Outcome: Progressing  Goal: Maintain or return to baseline ADL function  Description: INTERVENTIONS:  -  Assess patient's ability to carry out ADLs; assess patient's baseline for ADL function and identify physical deficits which impact ability to perform ADLs (bathing, care of mouth/teeth, toileting, grooming, dressing, etc )  - Assess/evaluate cause of self-care deficits   - Assess range of motion  - Assess patient's mobility; develop plan if impaired  - Assess patient's need for assistive devices and provide as appropriate  - Encourage maximum independence but intervene and supervise when necessary  - Involve family in performance of ADLs  - Assess for home care needs following discharge   - Consider OT consult to assist with ADL evaluation and planning for discharge  - Provide patient education as appropriate  Outcome: Progressing  Goal: Maintains/Returns to pre admission functional level  Description: INTERVENTIONS:  - Perform BMAT or MOVE assessment daily    - Set and communicate daily mobility goal to care team and patient/family/caregiver  - Collaborate with rehabilitation services on mobility goals if consulted  - Perform Range of Motion  times a day  - Reposition patient every  hours    - Dangle patient  times a day  - Stand patient  times a day  - Ambulate patient  times a day  - Out of bed to chair  times a day   - Out of bed for meal times a day  - Out of bed for toileting  - Record patient progress and toleration of activity level   Outcome: Progressing     Problem: DISCHARGE PLANNING  Goal: Discharge to home or other facility with appropriate resources  Description: INTERVENTIONS:  - Identify barriers to discharge w/patient and caregiver  - Arrange for needed discharge resources and transportation as appropriate  - Identify discharge learning needs (meds, wound care, etc )  - Arrange for interpretive services to assist at discharge as needed  - Refer to Case Management Department for coordinating discharge planning if the patient needs post-hospital services based on physician/advanced practitioner order or complex needs related to functional status, cognitive ability, or social support system  Outcome: Progressing

## 2022-04-10 NOTE — PROGRESS NOTES
Progress Note - OB/GYN   Evelyn Muhammad 25 y o  female MRN: [id number]  Unit/Bed#:  311-01 Encounter: [id number]    Assessment:  Post partum Day #1 s/p , stable, baby in room    Plan:  1) Right calf tenderness   Resolved   Discuss if duplex needed  2) Continue routine post partum care   Encourage ambulation   Encourage breastfeeding   Anticipate discharge PPD#2     Subjective/Objective   Chief Complaint:     Post delivery  Patient is doing well  Lochia WNL  Pain well controlled  Patient now states that right calf tenderness has resolved    Subjective:     Pain: yes, cramping, improved with meds  Tolerating PO: yes  Voiding: yes  Flatus:  Yes  Ambulating: yes  Chest pain: no  Shortness of breath: no  Leg pain: no  Lochia: minimal    Objective:     Vitals: /69 (BP Location: Right arm)   Pulse 81   Temp 98 5 °F (36 9 °C) (Oral)   Resp 18   LMP 2021   SpO2 97%   Breastfeeding Yes     I/O        07 0700  0701  04/10 07    Urine  1450    Blood  192    Total Output  1642    Net  -1642          Unmeasured Urine Occurrence  1 x          Lab Results   Component Value Date    WBC 11 50 (H) 2022    HGB 12 6 2022    HCT 38 6 2022     (H) 2022     2022       Physical Exam:     Gen: AAOx3, NAD  CV: RRR  Lungs: CTA b/l  Abd: Soft, non-tender, non-distended, no rebound or guarding  Uterine fundus firm and non-tender, 1 cm below the umbilicus    Ext: Non tender    Hardeep Milton MD  4/10/2022  3:23 AM

## 2022-04-10 NOTE — LACTATION NOTE
This note was copied from a baby's chart  CONSULT - LACTATION  Baby Girl (Reyes Brenner Kamran 1 days female MRN: [id number]    [facility] NURSERY Room / Bed:  311(N)/ 311(N) Encounter: [id number]    Maternal Information     MOTHER:  Rosetta Dominguez  Maternal Age: 25 y o    OB History: # 1 - Date: 20, Sex: None, Weight: None, GA: None, Delivery: None, Apgar1: None, Apgar5: None, Living: None, Birth Comments: None    # 2 - Date: 22, Sex: Female, Weight: 3260 g (7 lb 3 oz), GA: 39w2d, Delivery: Vaginal, Spontaneous, Apgar1: 8, Apgar5: 9, Living: Living, Birth Comments: None   Previouse breast reduction surgery? No    Lactation history:   Has patient previously breast fed: No   How long had patient previously breast fed:     Previous breast feeding complications:       Past Surgical History:   Procedure Laterality Date    APPENDECTOMY          Birth information:  YOB: 2021   Time of birth: 12:43 PM   Sex: female   Delivery type: Vaginal, Spontaneous   Birth Weight: 3260 g (7 lb 3 oz)   Percent of Weight Change: -1%     Gestational Age: 44w2d   [unfilled]    Assessment     Breast and nipple assessment: normal assessment     Assessment: normal assessment    Feeding assessment: not assessed  LATCH:  Latch: Audible Swallowing:    Type of Nipple:    Comfort (Breast/Nipple):    Hold (Positioning):    LATCH Score:         Feeding recommendations:  breast feed on demand     Met with Lianet and provided her with the  Ready Set Baby Booklet  Discussed Skin to Skin contact and benefits to mom and baby  Feeding on cue and what that means for recognizing infant's hunger reviewed  Avoidance of pacifiers for the first month discussed  Talked about exclusive breastfeeding for the first 6 months  Positioning and latch reviewed as well as showing images of other feeding positions    Discussed the properties of a good latch in any position     - Align nose to nipple and drag nipple down to chin to achieve a wide deep latch   - Bring baby to breast,not breast to baby ( no hunching over )   - Baby's ear, shoulder, hip in alignment   - Baby's upper and lower lip should be flanged on the breast    Reviewed hand/manual expression  Mom was able to hand express her colostrum independently  Gave information on common concerns, what to expect the first few weeks after delivery, preparing for other caregivers, and how partners can help  Resources for support also provided  Instructed Lianet to call for breastfeeding support as needed  Discharge Breastfeeding Booklet was left at bedside for Lianet to review          Liset Morin RN 4/10/2022 4:48 PM

## 2022-04-10 NOTE — DISCHARGE INSTRUCTIONS
Vaginal Delivery   WHAT YOU SHOULD KNOW:   A vaginal delivery is the birth of your baby through your vagina (birth canal)  AFTER YOU LEAVE:   Medicines:  · NSAIDs  help decrease swelling and pain or fever  This medicine is available with or without a doctor's order  NSAIDs can cause stomach bleeding or kidney problems in certain people  If you take blood thinner medicine, always ask your healthcare provider if NSAIDs are safe for you  Always read the medicine label and follow directions  · Take your medicine as directed  Call your healthcare provider if you think your medicine is not helping or if you have side effects  Tell him if you are allergic to any medicine  Keep a list of the medicines, vitamins, and herbs you take  Include the amounts, and when and why you take them  Bring the list or the pill bottles to follow-up visits  Carry your medicine list with you in case of an emergency  Follow up with your primary healthcare provider:  Most women need to return 6 weeks after a vaginal delivery  Ask about how to care for your wounds or stitches  Write down your questions so you remember to ask them during your visits  Activity:  Rest as much as possible  Try to keep all activities short  You may be able to do some exercise soon after you have your baby  Talk with your primary healthcare provider before you start exercising  If you work outside the home, ask when you can return to your job  Kegel exercises:  Kegel exercises may help your vaginal and rectal muscles heal faster  You can do Kegel exercises by tightening and relaxing the muscles around your vagina  Kegel exercises help make the muscles stronger  Breast care:  When your milk comes in, your breasts may feel full and hard  Ask how to care for your breasts, even if you are not breastfeeding  Constipation:  Do not try to push the bowel movement out if it is too hard   High-fiber foods, extra liquids, and regular exercise can help you prevent constipation  Examples of high-fiber foods are fruit and bran  Prune juice and water are good liquids to drink  Regular exercise helps your digestive system work  You may also be told to take over-the-counter fiber and stool softener medicines  Take these items as directed  Hemorrhoids:  Pregnancy can cause severe hemorrhoids  You may have rectal pain because of the hemorrhoids  Ask how to prevent or treat hemorrhoids  Perineum care: Your perineum is the area between your vagina and anus  Keep the area clean and dry to help it heal and to prevent infection  Wash the area gently with soap and water when you bathe or shower  Rinse your perineum with warm water when you use the toilet  Your primary healthcare provider may suggest you use a warm sitz bath to help decrease pain  A sitz bath is a bathtub or basin filled to hip level  Stay in the sitz bath for 20 to 30 minutes, or as directed  Vaginal discharge: You will have vaginal discharge, called lochia, after your delivery  The lochia is bright red the first day or two after the birth  By the fourth day, the amount decreases, and it turns red-brown  Use a sanitary pad rather than a tampon to prevent a vaginal infection  It is normal to have lochia up to 8 weeks after your baby is born  Monthly periods: Your period may start again within 7 to 12 weeks after your baby is born  If you are breastfeeding, it may take longer for your period to start again  You can still get pregnant again even though you do not have your monthly period  Talk with your primary healthcare provider about a birth control method that will be good for you if you do not want to get pregnant  Mood changes: Many new mothers have some kind of mood changes after delivery  Some of these changes occur because of lack of sleep, hormone changes, and caring for a new baby  Some mood changes can be more serious, such as postpartum depression   Talk with your primary healthcare provider if you feel unable to care for yourself or your baby  Sexual activity:  You may need to avoid sex for 6 to 7 weeks after you have your baby  You may notice you have a decreased desire for sex, or sex may be painful  You may need to use a vaginal lubricant (gel) to help make sex more comfortable  Contact your primary healthcare provider if:   · You have heavy vaginal bleeding that fills 1 or more sanitary pads in 1 hour  · You have a fever  · Your pain does not go away, or gets worse  · The skin between your vagina and rectum is swollen, warm, or red  · You have swollen, hard, or painful breasts  · You feel very sad or depressed  · You feel more tired than usual      · You have questions or concerns about your condition or care  Seek care immediately or call 911 if:   · You have pus or yellow drainage coming from your vagina or wound  · You are urinating very little, or not at all  · Your arm or leg feels warm, tender, and painful  It may look swollen and red  · You feel lightheaded, have sudden and worsening chest pain, or trouble breathing  You may have more pain when you take deep breaths or cough, or you may cough up blood  © 2014 [address] is for End User's use only and may not be sold, redistributed or otherwise used for commercial purposes  All illustrations and images included in CareNotes® are the copyrighted property of [employer] , [employer]  or Delvin Thompson  The above information is an  only  It is not intended as medical advice for individual conditions or treatments  Talk to your doctor, nurse or pharmacist before following any medical regimen to see if it is safe and effective for you

## 2022-04-11 VITALS
HEART RATE: 73 BPM | RESPIRATION RATE: 18 BRPM | OXYGEN SATURATION: 98 % | SYSTOLIC BLOOD PRESSURE: 122 MMHG | DIASTOLIC BLOOD PRESSURE: 60 MMHG | TEMPERATURE: 97.8 F

## 2022-04-11 PROCEDURE — 99024 POSTOP FOLLOW-UP VISIT: CPT | Performed by: OBSTETRICS & GYNECOLOGY

## 2022-04-11 RX ORDER — DIPHENHYDRAMINE HCL 25 MG
25 TABLET ORAL EVERY 6 HOURS PRN
Qty: 30 TABLET | Refills: 0 | Status: CANCELLED
Start: 2022-04-11

## 2022-04-11 RX ORDER — IBUPROFEN 600 MG/1
600 TABLET ORAL EVERY 6 HOURS
Qty: 30 TABLET | Refills: 0
Start: 2022-04-11 | End: 2022-05-29 | Stop reason: ALTCHOICE

## 2022-04-11 RX ORDER — ACETAMINOPHEN 325 MG/1
650 TABLET ORAL EVERY 4 HOURS PRN
Refills: 0
Start: 2022-04-11 | End: 2022-05-29 | Stop reason: ALTCHOICE

## 2022-04-11 RX ADMIN — ACETAMINOPHEN 650 MG: 325 TABLET ORAL at 08:18

## 2022-04-11 RX ADMIN — IBUPROFEN 600 MG: 600 TABLET ORAL at 05:03

## 2022-04-11 NOTE — TELEPHONE ENCOUNTER
Attempted to call patient this am , upon chart review patient is at L&D and has delivered on 04/09/2022

## 2022-04-11 NOTE — PLAN OF CARE
Problem: Knowledge Deficit  Goal: Verbalizes understanding of labor plan  Description: Assess patient/family/caregiver's baseline knowledge level and ability to understand information  Provide education via patient/family/caregiver's preferred learning method at appropriate level of understanding  1  Provide teaching at level of understanding  2  Provide teaching via preferred learning method(s)  Outcome: Completed  Goal: Patient/family/caregiver demonstrates understanding of disease process, treatment plan, medications, and discharge instructions  Description: Complete learning assessment and assess knowledge base  Interventions:  - Provide teaching at level of understanding  - Provide teaching via preferred learning methods  Outcome: Completed     Problem: Labor & Delivery  Goal: Manages discomfort  Description: Assess and monitor for signs and symptoms of discomfort  Assess patient's pain level regularly and per hospital policy  Administer medications as ordered  Support use of nonpharmacological methods to help control pain such as distraction, imagery, relaxation, and application of heat and cold  Collaborate with interdisciplinary team and patient to determine appropriate pain management plan  1  Include patient in decisions related to comfort  2  Offer non-pharmacological pain management interventions  3  Report ineffective pain management to physician  Outcome: Completed  Goal: Patient vital signs are stable  Description: 1  Assess vital signs - vaginal delivery    Outcome: Completed     Problem: PAIN - ADULT  Goal: Verbalizes/displays adequate comfort level or baseline comfort level  Description: Interventions:  - Encourage patient to monitor pain and request assistance  - Assess pain using appropriate pain scale  - Administer analgesics based on type and severity of pain and evaluate response  - Implement non-pharmacological measures as appropriate and evaluate response  - Consider cultural and social influences on pain and pain management  - Notify physician/advanced practitioner if interventions unsuccessful or patient reports new pain  Outcome: Completed     Problem: INFECTION - ADULT  Goal: Absence or prevention of progression during hospitalization  Description: INTERVENTIONS:  - Assess and monitor for signs and symptoms of infection  - Monitor lab/diagnostic results  - Monitor all insertion sites, i e  indwelling lines, tubes, and drains  - Monitor endotracheal if appropriate and nasal secretions for changes in amount and color  - [place] appropriate cooling/warming therapies per order  - Administer medications as ordered  - Instruct and encourage patient and family to use good hand hygiene technique  - Identify and instruct in appropriate isolation precautions for identified infection/condition  Outcome: Completed  Goal: Absence of fever/infection during neutropenic period  Description: INTERVENTIONS:  - Monitor WBC    Outcome: Completed     Problem: SAFETY ADULT  Goal: Patient will remain free of falls  Description: INTERVENTIONS:  - Educate patient/family on patient safety including physical limitations  - Instruct patient to call for assistance with activity   - Consult OT/PT to assist with strengthening/mobility   - Keep Call bell within reach  - Keep bed low and locked with side rails adjusted as appropriate  - Keep care items and personal belongings within reach  - Initiate and maintain comfort rounds  - Make Fall Risk Sign visible to staff  - Offer Toileting every  Hours, in advance of need  - Initiate/Maintain alarm  - Obtain necessary fall risk management equipment:   - Apply yellow socks and bracelet for high fall risk patients  - Consider moving patient to room near nurses station  Outcome: Completed  Goal: Maintain or return to baseline ADL function  Description: INTERVENTIONS:  -  Assess patient's ability to carry out ADLs; assess patient's baseline for ADL function and identify physical deficits which impact ability to perform ADLs (bathing, care of mouth/teeth, toileting, grooming, dressing, etc )  - Assess/evaluate cause of self-care deficits   - Assess range of motion  - Assess patient's mobility; develop plan if impaired  - Assess patient's need for assistive devices and provide as appropriate  - Encourage maximum independence but intervene and supervise when necessary  - Involve family in performance of ADLs  - Assess for home care needs following discharge   - Consider OT consult to assist with ADL evaluation and planning for discharge  - Provide patient education as appropriate  Outcome: Completed  Goal: Maintains/Returns to pre admission functional level  Description: INTERVENTIONS:  - Perform BMAT or MOVE assessment daily    - Set and communicate daily mobility goal to care team and patient/family/caregiver  - Collaborate with rehabilitation services on mobility goals if consulted  - Perform Range of Motion  times a day  - Reposition patient every  hours    - Dangle patient  times a day  - Stand patient  times a day  - Ambulate patient  times a day  - Out of bed to chair  times a day   - Out of bed for meals  times a day  - Out of bed for toileting  - Record patient progress and toleration of activity level   Outcome: Completed     Problem: DISCHARGE PLANNING  Goal: Discharge to home or other facility with appropriate resources  Description: INTERVENTIONS:  - Identify barriers to discharge w/patient and caregiver  - Arrange for needed discharge resources and transportation as appropriate  - Identify discharge learning needs (meds, wound care, etc )  - Arrange for interpretive services to assist at discharge as needed  - Refer to Case Management Department for coordinating discharge planning if the patient needs post-hospital services based on physician/advanced practitioner order or complex needs related to functional status, cognitive ability, or social support system  Outcome: Completed

## 2022-04-11 NOTE — PROGRESS NOTES
Progress Note - OB/GYN   Shon Shankar 25 y o  female MRN: [id number]  Unit/Bed#:  311-01 Encounter: [id number]    Assessment:  Post partum Day #2 s/p , stable, baby in room     Plan:  1) Right calf tenderness              Resolved              Discuss if duplex needed  2) Continue routine post partum care              Encourage ambulation              Encourage breastfeeding              Anticipate discharge PPD#2     Subjective/Objective   Chief Complaint:     Post delivery  Patient is doing well  Lochia WNL  Pain well controlled  Subjective:     Pain: yes, cramping, improved with meds  Tolerating PO: yes  Voiding: yes  Flatus: yes  Ambulating: yes  Chest pain: no  Shortness of breath: no  Leg pain: no  Lochia: minimal    Objective:     Vitals: /72 (BP Location: Right arm)   Pulse 65   Temp 97 9 °F (36 6 °C) (Oral)   Resp 18   LMP 2021   SpO2 98%   Breastfeeding Yes     I/O        0701  04/10 0700 04/10 0701   07    Urine 1450     Blood 192     Total Output 1642     Net -1642           Unmeasured Urine Occurrence 1 x           Lab Results   Component Value Date    WBC 11 50 (H) 2022    HGB 12 6 2022    HCT 38 6 2022     (H) 2022     2022       Physical Exam:     Gen: AAOx3, NAD  CV: RRR  Lungs: CTA b/l  Abd: Soft, non-tender, non-distended, no rebound or guarding  Uterine fundus firm and non-tender, 1 cm below the umbilicus    Ext: Non tender    Pauline Parekh MD  2022  6:49 AM

## 2022-04-11 NOTE — PLAN OF CARE
Problem: Knowledge Deficit  Goal: Verbalizes understanding of labor plan  Description: Assess patient/family/caregiver's baseline knowledge level and ability to understand information  Provide education via patient/family/caregiver's preferred learning method at appropriate level of understanding  1  Provide teaching at level of understanding  2  Provide teaching via preferred learning method(s)  Outcome: Progressing  Goal: Patient/family/caregiver demonstrates understanding of disease process, treatment plan, medications, and discharge instructions  Description: Complete learning assessment and assess knowledge base  Interventions:  - Provide teaching at level of understanding  - Provide teaching via preferred learning methods  Outcome: Progressing     Problem: Labor & Delivery  Goal: Manages discomfort  Description: Assess and monitor for signs and symptoms of discomfort  Assess patient's pain level regularly and per hospital policy  Administer medications as ordered  Support use of nonpharmacological methods to help control pain such as distraction, imagery, relaxation, and application of heat and cold  Collaborate with interdisciplinary team and patient to determine appropriate pain management plan  1  Include patient in decisions related to comfort  2  Offer non-pharmacological pain management interventions  3  Report ineffective pain management to physician  Outcome: Progressing  Goal: Patient vital signs are stable  Description: 1  Assess vital signs - vaginal delivery    Outcome: Progressing     Problem: PAIN - ADULT  Goal: Verbalizes/displays adequate comfort level or baseline comfort level  Description: Interventions:  - Encourage patient to monitor pain and request assistance  - Assess pain using appropriate pain scale  - Administer analgesics based on type and severity of pain and evaluate response  - Implement non-pharmacological measures as appropriate and evaluate response  - Consider cultural and social influences on pain and pain management  - Notify physician/advanced practitioner if interventions unsuccessful or patient reports new pain  Outcome: Progressing     Problem: INFECTION - ADULT  Goal: Absence or prevention of progression during hospitalization  Description: INTERVENTIONS:  - Assess and monitor for signs and symptoms of infection  - Monitor lab/diagnostic results  - Monitor all insertion sites, i e  indwelling lines, tubes, and drains  - Monitor endotracheal if appropriate and nasal secretions for changes in amount and color  - [place] appropriate cooling/warming therapies per order  - Administer medications as ordered  - Instruct and encourage patient and family to use good hand hygiene technique  - Identify and instruct in appropriate isolation precautions for identified infection/condition  Outcome: Progressing  Goal: Absence of fever/infection during neutropenic period  Description: INTERVENTIONS:  - Monitor WBC    Outcome: Progressing     Problem: SAFETY ADULT  Goal: Patient will remain free of falls  Description: INTERVENTIONS:  - Educate patient/family on patient safety including physical limitations  - Instruct patient to call for assistance with activity   - Consult OT/PT to assist with strengthening/mobility   - Keep Call bell within reach  - Keep bed low and locked with side rails adjusted as appropriate  - Keep care items and personal belongings within reach  - Initiate and maintain comfort rounds  - Make Fall Risk Sign visible to staff  - Offer Toileting every  Hours, in advance of need  - Initiate/Maintain alarm  - Obtain necessary fall risk management equipment:   - Apply yellow socks and bracelet for high fall risk patients  - Consider moving patient to room near nurses station  Outcome: Progressing  Goal: Maintain or return to baseline ADL function  Description: INTERVENTIONS:  -  Assess patient's ability to carry out ADLs; assess patient's baseline for ADL function and identify physical deficits which impact ability to perform ADLs (bathing, care of mouth/teeth, toileting, grooming, dressing, etc )  - Assess/evaluate cause of self-care deficits   - Assess range of motion  - Assess patient's mobility; develop plan if impaired  - Assess patient's need for assistive devices and provide as appropriate  - Encourage maximum independence but intervene and supervise when necessary  - Involve family in performance of ADLs  - Assess for home care needs following discharge   - Consider OT consult to assist with ADL evaluation and planning for discharge  - Provide patient education as appropriate  Outcome: Progressing  Goal: Maintains/Returns to pre admission functional level  Description: INTERVENTIONS:  - Perform BMAT or MOVE assessment daily    - Set and communicate daily mobility goal to care team and patient/family/caregiver  - Collaborate with rehabilitation services on mobility goals if consulted  - Perform Range of Motion  times a day  - Reposition patient every  hours    - Dangle patient  times a day  - Stand patient  times a day  - Ambulate patient  times a day  - Out of bed to chair  times a day   - Out of bed for meals times a day  - Out of bed for toileting  - Record patient progress and toleration of activity level   Outcome: Progressing     Problem: DISCHARGE PLANNING  Goal: Discharge to home or other facility with appropriate resources  Description: INTERVENTIONS:  - Identify barriers to discharge w/patient and caregiver  - Arrange for needed discharge resources and transportation as appropriate  - Identify discharge learning needs (meds, wound care, etc )  - Arrange for interpretive services to assist at discharge as needed  - Refer to Case Management Department for coordinating discharge planning if the patient needs post-hospital services based on physician/advanced practitioner order or complex needs related to functional status, cognitive ability, or social support system  Outcome: Progressing

## 2022-04-12 NOTE — UTILIZATION REVIEW
Inpatient Admission Authorization Request   Notification of Maternity/Delivery &  Birth Information for Admission   SERVICING FACILITY:   [address]  Tax ID: [id number]  NPI: [id number]  Place of Service: Inpatient [facility]  [address]  Place of Service Code: 24     ATTENDING PROVIDER:  Attending Name and NPI#: Yahir Green Md [[id number]]  Address: Ankur Herr  [address]  Phone: [phone]     UTILIZATION REVIEW CONTACT:  Jose Resendiz, Utilization Review Supervisor  Network Utilization Review Department  Phone: [phone]  Fax [phone]  Email: [email or web address]     PHYSICIAN ADVISORY SERVICES:  FOR GCLA-QK-QZNK REVIEW - MEDICAL NECESSITY DENIAL  Phone: [phone]  Fax: [phone]  Email: [email or web address]     TYPE OF REQUEST:  Inpatient Status     ADMISSION INFORMATION:  ADMISSION DATE/TIME: 22  8:54 AM  PATIENT DIAGNOSIS CODE/DESCRIPTION:  Uterine contractions [O47 9]  Abdominal pain [R10 9]  39 weeks gestation of pregnancy [Z3A 39]  Encounter for full-term uncomplicated delivery [D85] The encounter diagnosis was  (spontaneous vaginal delivery)  1   (spontaneous vaginal delivery)      DISCHARGE DATE/TIME: 2022 12:11 PM   MOTHER AND  INFORMATION:  Mother: Heather Miller 2001   Delivering clinician:    OB History        2    Para   1    Term   1            AB   1    Living   1       SAB        IAB        Ectopic        Multiple   0    Live Births   1                Name & MRN:   Information for the patient's :  Christopher Ayala [[id number]]      Delivery Information:  Sex: female  Delivered 2022 12:43 PM by Vaginal, Spontaneous; Gestational Age: 44w2d    [place] Measurements:  Weight: 7 lb 3 oz (3260 g);   Height: 19 5"    APGAR 1 minute 5 minutes 10 minutes   Totals: 8 9      [place] Birth Information: 25 y o  female MRN: [id number] Unit/Bed#: -01 Estimated Date of Delivery: 4/14/22  Birthweight: No birth weight on file  Gestational Age: <None> Delivery Type: Vaginal, Spontaneous    IMPORTANT INFORMATION:  Please contact the [facility] directly with any questions or concerns regarding this request  Department voicemails are confidential     Send requests for admission clinical reviews, concurrent reviews, approvals, and administrative denials due to lack of clinical to fax [phone]

## 2022-04-15 LAB — PLACENTA IN STORAGE: NORMAL

## 2022-05-18 ENCOUNTER — POSTPARTUM VISIT (OUTPATIENT)
Dept: OBGYN CLINIC | Facility: CLINIC | Age: 22
End: 2022-05-18

## 2022-05-18 VITALS — DIASTOLIC BLOOD PRESSURE: 70 MMHG | SYSTOLIC BLOOD PRESSURE: 104 MMHG | BODY MASS INDEX: 24.55 KG/M2 | WEIGHT: 143 LBS

## 2022-05-18 DIAGNOSIS — Z30.011 ENCOUNTER FOR INITIAL PRESCRIPTION OF CONTRACEPTIVE PILLS: ICD-10-CM

## 2022-05-18 PROCEDURE — 99024 POSTOP FOLLOW-UP VISIT: CPT | Performed by: NURSE PRACTITIONER

## 2022-05-18 RX ORDER — ACETAMINOPHEN AND CODEINE PHOSPHATE 120; 12 MG/5ML; MG/5ML
1 SOLUTION ORAL DAILY
Qty: 84 TABLET | Refills: 3 | Status: SHIPPED | OUTPATIENT
Start: 2022-05-18

## 2022-05-18 NOTE — PROGRESS NOTES
Aiden Bowers is a 25 y o  female who presents for a postpartum visit  She is 5 weeks postpartum following a spontaneous vaginal delivery  I have fully reviewed the prenatal and intrapartum course  The delivery was at 44 gestational weeks  Outcome: spontaneous vaginal delivery  Anesthesia: epidural  Postpartum course has been uncomplicated  Baby's course has been uncomplicated  Baby is feeding by both breast and bottle - she is formula and breast    States decreased milk supply  Bleeding no bleeding  Bowel function is normal  Bladder function is normal  Patient is sexually active  Has used protection, barrier  Contraception method is none  Postpartum depression screening: negative  EPDS: 5    The following portions of the patient's history were reviewed and updated as appropriate: allergies, current medications, past family history, past medical history, past social history, past surgical history and problem list     Review of Systems  Pertinent items are noted in HPI       Objective     /70 (BP Location: Right arm, Patient Position: Sitting, Cuff Size: Standard)   Wt 64 9 kg (143 lb)   LMP 06/30/2021   BMI 24 55 kg/m²    General:  alert and oriented, in no acute distress    Breasts:  not performed   Lungs: clear to auscultation bilaterally   Heart:  regular rate and rhythm, S1, S2 normal, no murmur, click, rub or gallop   Abdomen: soft, non-tender; bowel sounds normal; no masses,  no organomegaly    Vulva:  normal   Vagina: normal vagina, no discharge, exudate, lesion, or erythema   Cervix:  no cervical motion tenderness and no lesions   Corpus: normal size, contour, position, consistency, mobility, non-tender   Adnexa:  normal adnexa   Rectal Exam: Not performed  Assessment/Plan     Normal  postpartum exam      1  Contraception: Interested in [facility], will start progesterone only pill until approval and insertion of Nexplanon  2  May resume normal activity   Water based lubricant with intercourse  3  Follow up in: For Nexplanon insertion or as needed

## 2022-05-29 ENCOUNTER — HOSPITAL ENCOUNTER (EMERGENCY)
Facility: HOSPITAL | Age: 22
Discharge: HOME | End: 2022-05-29
Attending: EMERGENCY MEDICINE
Payer: COMMERCIAL

## 2022-05-29 ENCOUNTER — NURSE TRIAGE (OUTPATIENT)
Dept: OTHER | Facility: OTHER | Age: 22
End: 2022-05-29

## 2022-05-29 VITALS
OXYGEN SATURATION: 96 % | DIASTOLIC BLOOD PRESSURE: 77 MMHG | HEIGHT: 64 IN | SYSTOLIC BLOOD PRESSURE: 119 MMHG | WEIGHT: 145.7 LBS | BODY MASS INDEX: 24.87 KG/M2 | RESPIRATION RATE: 18 BRPM | TEMPERATURE: 101.2 F | HEART RATE: 118 BPM

## 2022-05-29 DIAGNOSIS — Z20.822 COVID-19 VIRUS TEST RESULT UNKNOWN: ICD-10-CM

## 2022-05-29 DIAGNOSIS — J06.9 VIRAL URI WITH COUGH: Primary | ICD-10-CM

## 2022-05-29 PROCEDURE — 99283 EMERGENCY DEPT VISIT LOW MDM: CPT

## 2022-05-29 PROCEDURE — 99284 EMERGENCY DEPT VISIT MOD MDM: CPT | Performed by: PHYSICIAN ASSISTANT

## 2022-05-29 PROCEDURE — 87636 SARSCOV2 & INF A&B AMP PRB: CPT | Performed by: PHYSICIAN ASSISTANT

## 2022-05-29 RX ORDER — ACETAMINOPHEN 325 MG/1
650 TABLET ORAL ONCE
Status: COMPLETED | OUTPATIENT
Start: 2022-05-29 | End: 2022-05-29

## 2022-05-29 RX ORDER — FLUTICASONE PROPIONATE 50 MCG
1 SPRAY, SUSPENSION (ML) NASAL DAILY
Qty: 16 G | Refills: 0 | Status: SHIPPED | OUTPATIENT
Start: 2022-05-29

## 2022-05-29 RX ORDER — ACETAMINOPHEN 500 MG
500 TABLET ORAL EVERY 6 HOURS PRN
Qty: 30 TABLET | Refills: 0 | Status: SHIPPED | OUTPATIENT
Start: 2022-05-29

## 2022-05-29 RX ORDER — GUAIFENESIN/DEXTROMETHORPHAN 100-10MG/5
5 SYRUP ORAL 3 TIMES DAILY PRN
Qty: 118 ML | Refills: 0 | Status: SHIPPED | OUTPATIENT
Start: 2022-05-29 | End: 2022-06-08

## 2022-05-29 RX ORDER — IBUPROFEN 400 MG/1
400 TABLET ORAL EVERY 6 HOURS PRN
Qty: 12 TABLET | Refills: 0 | Status: SHIPPED | OUTPATIENT
Start: 2022-05-29

## 2022-05-29 RX ADMIN — ACETAMINOPHEN 650 MG: 325 TABLET ORAL at 09:49
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
## 2022-05-29 NOTE — TELEPHONE ENCOUNTER
Reason for Disposition   Patient sounds very sick or weak to the triager    Answer Assessment - Initial Assessment Questions  1  COVID-19 DIAGNOSIS: "Who made your COVID-19 diagnosis?" "Was it confirmed by a positive lab test or self-test?" If not diagnosed by a doctor (or NP/PA), ask "Are there lots of cases (community spread) where you live?" Note: See public health department website, if unsure  Yes tested positive on Monday but is getting worse   2  COVID-19 EXPOSURE: "Was there any known exposure to COVID before the symptoms began?" CDC Definition of close contact: within 6 feet (2 meters) for a total of 15 minutes or more over a 24-hour period  Unsure   3  ONSET: "When did the COVID-19 symptoms start?"       5 23 22 but sx's are getting worse   4  WORST SYMPTOM: "What is your worst symptom?" (e g , cough, fever, shortness of breath, muscle aches)      Feeling weak and not being able to walk  5  COUGH: "Do you have a cough?" If Yes, ask: "How bad is the cough?"        Yes   6  FEVER: "Do you have a fever?" If Yes, ask: "What is your temperature, how was it measured, and when did it start?"      Denies  7  RESPIRATORY STATUS: "Describe your breathing?" (e g , shortness of breath, wheezing, unable to speak)       Denies   8  BETTER-SAME-WORSE: "Are you getting better, staying the same or getting worse compared to yesterday?"  If getting worse, ask, "In what way?"      Lightheaded and Dizzy   9  HIGH RISK DISEASE: "Do you have any chronic medical problems?" (e g , asthma, heart or lung disease, weak immune system, obesity, etc )     Asthma   10  VACCINE: "Have you had the COVID-19 vaccine?" If Yes, ask: "Which one, how many shots, when did you get it?"       Denies   11  BOOSTER: "Have you received your COVID-19 booster?" If Yes, ask: "Which one and when did you get it?"        Denies   12   PREGNANCY: "Is there any chance you are pregnant?" "When was your last menstrual period?"        Just gave birth 6 weeks ago   15  OTHER SYMPTOMS: "Do you have any other symptoms?"  (e g , chills, fatigue, headache, loss of smell or taste, muscle pain, sore throat)       Yes    Pt fell twice getting out of bed due to feeling lightheaded and dizzy  Pt has not been drinking enough and unsure if she has been urinating every 8 hours  Pt's EC advised to have pt seen at the nearest ED due to sx's      Protocols used: CORONAVIRUS (COVID-19) DIAGNOSED OR SUSPECTED-ADULT-

## 2022-05-29 NOTE — TELEPHONE ENCOUNTER
Regarding: +COVID, extreme weakness, seems out of it, fell, hot, HA, body aches, nausea, cough/congestion  ----- Message from Worthy  sent at 5/29/2022  8:32 AM EDT -----  "My girlfriend tested positive for COVID on Monday at ED; today she is extremely weak to the point she fell down when walking to the bathroom, she seems out of it, she is very hot, headache, body ache, nausea, cough and congestion "

## 2022-05-30 LAB
FLUAV RNA RESP QL NAA+PROBE: NEGATIVE
FLUBV RNA RESP QL NAA+PROBE: NEGATIVE
SARS-COV-2 RNA RESP QL NAA+PROBE: POSITIVE

## 2022-05-31 NOTE — TELEPHONE ENCOUNTER
Gave patient contact information to [facility] as we do not participate with insurance  She will call to schedule f/u appointment    Yadira Hernández MA

## 2022-05-31 NOTE — TELEPHONE ENCOUNTER
LMOM for patient to call office  Want to see how patient is doing after d/c from ER  Patient is covid positive    Patt Serrano MA

## 2022-08-13 NOTE — RESULT ENCOUNTER NOTE
Your COVID test is positive  Patient has seen in chart  Please stay quarantined/isolation in your home  Do not interact with your family or other people in the community  You must stay isolation/quarantine for a minimum of 5 days after symptoms have begun and be symptom-free (without fever) for 24 hours before you go out of quarantine/isolation  Then follow strict mask wearing for an additional 5 days  The people you live with or people should follow CDC recommendations regarding protocols  [employer] uy    Take Vitamin D3 2000 units daily  18yrs and older only    Please avoid in-store purchasing of supplies and medications    Please self-prone (sleep on your stomach if possible)      Please call this department with any questions or concerns    Call your family doctor when you receive these results    Return to the ED with any worsening symptoms show

## 2022-08-31 ENCOUNTER — OFFICE VISIT (OUTPATIENT)
Dept: OBGYN CLINIC | Facility: CLINIC | Age: 22
End: 2022-08-31
Payer: COMMERCIAL

## 2022-08-31 VITALS — WEIGHT: 139 LBS | SYSTOLIC BLOOD PRESSURE: 118 MMHG | BODY MASS INDEX: 23.86 KG/M2 | DIASTOLIC BLOOD PRESSURE: 72 MMHG

## 2022-08-31 DIAGNOSIS — S30.814A ABRASION OF LABIA, INITIAL ENCOUNTER: Primary | ICD-10-CM

## 2022-08-31 DIAGNOSIS — Z11.3 SCREEN FOR STD (SEXUALLY TRANSMITTED DISEASE): ICD-10-CM

## 2022-08-31 PROCEDURE — 99213 OFFICE O/P EST LOW 20 MIN: CPT | Performed by: NURSE PRACTITIONER

## 2022-08-31 NOTE — ASSESSMENT & PLAN NOTE
GC/CT/Trich testing done  Declines HIV, Hep B, Hep C, RPR  Will call with results and appropriate follow up

## 2022-08-31 NOTE — PROGRESS NOTES
Assessment/Plan:    Labial abrasion  Discussed with Lianet area of "hole" in labia minora not likely cause of discomfort  Area is above urethra and unlikely for urine to hit that area  When manipulating she did not express discomfort  Unsure if this hole had always been present, occurred during delivery or healing  May have always been present as only noted when pulling skin taut  If interested can follow up with physician and office to discuss repair of area  Did however note 3 small abrasions at perineum and one on left labia  More likely cause of discomfort  Expresses slight discomfort when palpating that area  Reviewed may be injury from intercourse/friction  Recommend no intercourse until healed  Can place coconut oil to help alleviate any discomfort  In future recommened water based lubricant and going slow with intercourse  Call office if symptoms worsen or fail to improve    Screen for STD (sexually transmitted disease)  GC/CT/Trich testing done  Declines HIV, Hep B, Hep C, RPR  Will call with results and appropriate follow up       Diagnoses and all orders for this visit:    Abrasion of labia, initial encounter    Screen for STD (sexually transmitted disease)  -     Chlamydia/GC amplified DNA by PCR  -     Trichomonas Vaginalis, VANESSA          Subjective:      Patient ID: Luis Riley is a 25 y o  female  Pt presents for a problem visit  States she has burning in vaginal area when urine hit it  Does not burn otherwise  Denies any abnormal discharge, itching, or odor  Symptoms began last night after intercourse  That was her first intercourse in a long time, maybe second time only since delivery 5 months ago  She went to use restroom and experience what felt like a burning sensation when urine touched the vagina  Denies any dysuria, urgency, frequency, or hesitancy  She later looked to see what could be causing her burning and noticed a small hole in her upper labia area   Denies any pain when touched or palpated  Wasn't sure if she re injured herself or if tear was still from delivery  Denies any new products vaginally    Does admit to sex being "rough" and "uncomfortable" felt like things were "dry"  Would like STD testing  The following portions of the patient's history were reviewed and updated as appropriate: allergies, current medications, past family history, past medical history, past social history, past surgical history and problem list     Review of Systems   Genitourinary: Positive for vaginal pain  Objective:      /72 (BP Location: Right arm, Patient Position: Sitting, Cuff Size: Standard)   Wt 63 kg (139 lb)   LMP 08/14/2022 (Approximate)   BMI 23 86 kg/m²          Physical Exam  Vitals and nursing note reviewed  Constitutional:       Appearance: She is well-developed  HENT:      Head: Normocephalic and atraumatic  Neck:      Thyroid: No thyromegaly  Cardiovascular:      Rate and Rhythm: Normal rate and regular rhythm  Heart sounds: Normal heart sounds  Pulmonary:      Effort: Pulmonary effort is normal       Breath sounds: Normal breath sounds  Abdominal:      General: Bowel sounds are normal  There is no distension  Palpations: Abdomen is soft  There is no mass  Tenderness: There is no abdominal tenderness  There is no guarding or rebound  Genitourinary:     Labia:         Right: Tenderness and injury present  No rash or lesion  Left: Tenderness and injury present  No rash or lesion  Vagina: Normal       Cervix: No cervical motion tenderness, discharge or friability  Adnexa:         Right: No mass, tenderness or fullness  Left: No mass, tenderness or fullness  Comments: Small "hole" in upper labia minora near clitoritis  No open skin or lesions  Hole only noticeable when pulling labia flat or taut  Initially looked like the way the skin folded   Upon manipulating labia you can see a 5mm hole with intact skin on either side, no erythema, lesions, drainage noted  Other lines noted on diagram represent small abrasions noted on perineum and one at skin fold of right labia minora  This represents area of discomfort  Pt felt slight discomfort when area palpated  Denies any bleeding after intercourse  Musculoskeletal:         General: Normal range of motion  Cervical back: Normal range of motion and neck supple  Skin:     General: Skin is warm and dry  Neurological:      Mental Status: She is alert and oriented to person, place, and time  Psychiatric:         Behavior: Behavior normal          Thought Content:  Thought content normal          Judgment: Judgment normal

## 2022-08-31 NOTE — ASSESSMENT & PLAN NOTE
Discussed with Lianet area of "hole" in labia minora not likely cause of discomfort  Area is above urethra and unlikely for urine to hit that area  When manipulating she did not express discomfort  Unsure if this hole had always been present, occurred during delivery or healing  May have always been present as only noted when pulling skin taut  If interested can follow up with physician and office to discuss repair of area  Did however note 3 small abrasions at perineum and one on left labia  More likely cause of discomfort  Expresses slight discomfort when palpating that area  Reviewed may be injury from intercourse/friction  Recommend no intercourse until healed  Can place coconut oil to help alleviate any discomfort  In future recommened water based lubricant and going slow with intercourse   Call office if symptoms worsen or fail to improve

## 2022-10-30 PROBLEM — Z11.3 SCREEN FOR STD (SEXUALLY TRANSMITTED DISEASE): Status: RESOLVED | Noted: 2022-08-31 | Resolved: 2022-10-30

## 2023-03-09 ENCOUNTER — APPOINTMENT (OUTPATIENT)
Dept: RADIOLOGY | Facility: CLINIC | Age: 23
End: 2023-03-09

## 2023-03-09 ENCOUNTER — OFFICE VISIT (OUTPATIENT)
Dept: URGENT CARE | Facility: CLINIC | Age: 23
End: 2023-03-09

## 2023-03-09 VITALS
RESPIRATION RATE: 18 BRPM | TEMPERATURE: 99.1 F | OXYGEN SATURATION: 99 % | HEART RATE: 99 BPM | BODY MASS INDEX: 27.14 KG/M2 | WEIGHT: 159 LBS | HEIGHT: 64 IN

## 2023-03-09 DIAGNOSIS — S99.921A FOOT INJURY, RIGHT, INITIAL ENCOUNTER: ICD-10-CM

## 2023-03-09 DIAGNOSIS — S90.121A CONTUSION OF FOURTH TOE OF RIGHT FOOT, INITIAL ENCOUNTER: Primary | ICD-10-CM

## 2023-03-09 RX ORDER — AMITRIPTYLINE HYDROCHLORIDE 25 MG/1
TABLET, FILM COATED ORAL
COMMUNITY
Start: 2023-01-13
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
## 2024-02-02 ENCOUNTER — OFFICE VISIT (OUTPATIENT)
Dept: URGENT CARE | Facility: CLINIC | Age: 24
End: 2024-02-02
Payer: COMMERCIAL

## 2024-02-02 VITALS
OXYGEN SATURATION: 97 % | TEMPERATURE: 98.3 F | HEART RATE: 87 BPM | RESPIRATION RATE: 20 BRPM | BODY MASS INDEX: 26.88 KG/M2 | WEIGHT: 156.6 LBS | SYSTOLIC BLOOD PRESSURE: 120 MMHG | DIASTOLIC BLOOD PRESSURE: 60 MMHG

## 2024-02-02 DIAGNOSIS — L03.011 PARONYCHIA OF FINGER, RIGHT: Primary | ICD-10-CM

## 2024-02-02 PROCEDURE — 99213 OFFICE O/P EST LOW 20 MIN: CPT | Performed by: PHYSICIAN ASSISTANT

## 2024-02-02 RX ORDER — SULFAMETHOXAZOLE AND TRIMETHOPRIM 800; 160 MG/1; MG/1
1 TABLET ORAL EVERY 12 HOURS SCHEDULED
Qty: 10 TABLET | Refills: 0 | Status: SHIPPED | OUTPATIENT
Start: 2024-02-02 | End: 2024-02-07

## 2024-02-02 NOTE — PATIENT INSTRUCTIONS
Paronychia   WHAT YOU NEED TO KNOW:   Paronychia is an infection of your nail fold caused by bacteria or a fungus. The nail fold is the skin around your nail. Paronychia may happen suddenly and last for 6 weeks or longer. You may have paronychia on more than 1 finger or toe.  DISCHARGE INSTRUCTIONS:   Return to the emergency department if:   You have severe nail pain.    The inflammation spreads to your hand or arm.    Call your doctor if:   Your nail becomes loose, deformed, or falls off.    You have a large abscess on your nail.    You have questions or concerns about your condition or care.    Medicines:   Td vaccine  is a booster shot used to help prevent tetanus and diphtheria. The Td booster may be given to adolescents and adults every 10 years or for certain wounds and injuries.    Antibiotics:  This medicine will help fight or prevent an infection. It may be given as a pill, cream, or ointment.    Steroids:  This medicine will help decrease inflammation. It may be given as a pill, cream, or ointment.    Antifungal medicine:  This medicine helps kill fungus that may be causing your infection. It may be given as a cream or ointment.    NSAIDs:  These medicines decrease pain and swelling. NSAIDs are available without a doctor's order. Ask your healthcare provider which medicine is right for you. Ask how much to take and when to take it. Take as directed. NSAIDs can cause stomach bleeding and kidney problems if not taken correctly.    Take your medicine as directed.  Contact your healthcare provider if you think your medicine is not helping or if you have side effects. Tell your provider if you are allergic to any medicine. Keep a list of the medicines, vitamins, and herbs you take. Include the amounts, and when and why you take them. Bring the list or the pill bottles to follow-up visits. Carry your medicine list with you in case of an emergency.    Self-care:   Soak your nail:  Soak your nail in a mixture of  equal parts vinegar and water 3 or 4 times each day. This will help decrease inflammation.    Apply a warm compress:  Soak a washcloth in warm water and place it on your nail. This will help decrease inflammation.     Elevate:  Raise your nail above the level of your heart as often as you can. This will help decrease swelling and pain. Prop your nail on pillows or blankets to keep it elevated comfortably.     Use lotion:  Apply lotion after you wash your hands. This will prevent your skin from becoming too dry.    Prevent paronychia:   Avoid chemicals and allergens that may harm your skin and nails. This includes soaps, laundry detergents, and nail products.    Keep your nails clean and dry. Avoid soaking your nails in water. Use cotton-lined rubber gloves or wear 2 rubber gloves if you work with food or water. The gloves will help protect your nail folds.    Keep your nails short. Do not bite your nails, pick at your hangnails, suck your fingers, or wear fake nails. Bring your own nail tools when you go to the nail salon.    Follow up with your doctor as directed:  Write down your questions so you remember to ask them during your visits.  © Copyright [employer] 2023 Information is for End User's use only and may not be sold, redistributed or otherwise used for commercial purposes.  The above information is an  only. It is not intended as medical advice for individual conditions or treatments. Talk to your doctor, nurse or pharmacist before following any medical regimen to see if it is safe and effective for you.
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
## 2024-03-15 NOTE — PATIENT INSTRUCTIONS
Kick Counts in Pregnancy   WHAT YOU NEED TO KNOW:   Kick counts measure how much your baby is moving in your womb  A kick from your baby can be felt as a twist, turn, swish, roll, or jab  It is common to feel your baby kicking at 26 to 28 weeks of pregnancy  You may feel your baby kick as early as 20 weeks of pregnancy  You may want to start counting at 28 weeks  DISCHARGE INSTRUCTIONS:   Contact your doctor immediately if:   · You feel a change in the number of kicks or movements of your baby  · You feel fewer than 10 kicks within 2 hours  · You have questions or concerns about your baby's movements  Why measure kick counts:  Your baby's movement may provide information about your baby's health  He or she may move less, or not at all, if there are problems  Your baby may move less if he or she is not getting enough oxygen or nutrition from the placenta  Do not smoke while you are pregnant  Smoking decreases the amount of oxygen that gets to your baby  Talk to your healthcare provider if you need help to quit smoking  Tell your healthcare provider as soon as you feel a change in your baby's movements  When to measure kick counts:   · Measure kick counts at the same time every day  · Measure kick counts when your baby is awake and most active  Your baby may be most active in the evening  How to measure kick counts:  Check that your baby is awake before you measure kick counts  You can wake up your baby by lightly pushing on your belly, walking, or drinking something cold  Your healthcare provider may tell you different ways to measure kick counts  You may be told to do the following:  · Use a chart or clock to keep track of the time you start and finish counting  · Sit in a chair or lie on your left side  · Place your hands on the largest part of your belly  · Count until you reach 10 kicks  Write down how much time it takes to count 10 kicks       · It may take 30 minutes to 2 hours The cardioversion/defibrillation pads were placed in the posterior/lateral position.  The presenting arrhythmia was atrial fibrillation. A direct-current 360 joule discharge was delivered synchronized to the ECG R-wave. The post cardioversion rhythm was sinus. to count 10 kicks  It should not take more than 2 hours to count 10 kicks  Follow up with your doctor as directed:  Write down your questions so you remember to ask them during your visits  © Copyright [employer] 2022 Information is for End User's use only and may not be sold, redistributed or otherwise used for commercial purposes  All illustrations and images included in CareNotes® are the copyrighted property of [employer]  or [facility] Leonidas Acosta   The above information is an  only  It is not intended as medical advice for individual conditions or treatments  Talk to your doctor, nurse or pharmacist before following any medical regimen to see if it is safe and effective for you